# Patient Record
Sex: FEMALE | Race: WHITE | NOT HISPANIC OR LATINO | Employment: FULL TIME | URBAN - METROPOLITAN AREA
[De-identification: names, ages, dates, MRNs, and addresses within clinical notes are randomized per-mention and may not be internally consistent; named-entity substitution may affect disease eponyms.]

---

## 2019-01-28 ENCOUNTER — OFFICE VISIT CONVERTED (OUTPATIENT)
Dept: PULMONOLOGY | Facility: CLINIC | Age: 34
End: 2019-01-28
Attending: INTERNAL MEDICINE

## 2019-02-27 ENCOUNTER — HOSPITAL ENCOUNTER (OUTPATIENT)
Dept: CARDIOLOGY | Facility: HOSPITAL | Age: 34
Discharge: HOME | End: 2019-02-27
Attending: INTERNAL MEDICINE

## 2019-03-28 ENCOUNTER — HOSPITAL ENCOUNTER (OUTPATIENT)
Dept: OTHER | Facility: HOSPITAL | Age: 34
Discharge: HOME | End: 2019-03-28

## 2019-03-28 ENCOUNTER — OFFICE VISIT CONVERTED (OUTPATIENT)
Dept: PULMONOLOGY | Facility: CLINIC | Age: 34
End: 2019-03-28
Attending: INTERNAL MEDICINE

## 2019-05-16 ENCOUNTER — CONVERSION ENCOUNTER (OUTPATIENT)
Dept: SURGERY | Facility: CLINIC | Age: 34
End: 2019-05-16

## 2019-05-16 ENCOUNTER — OFFICE VISIT CONVERTED (OUTPATIENT)
Dept: UROLOGY | Facility: CLINIC | Age: 34
End: 2019-05-16
Attending: UROLOGY

## 2019-06-20 ENCOUNTER — OFFICE VISIT CONVERTED (OUTPATIENT)
Dept: UROLOGY | Facility: CLINIC | Age: 34
End: 2019-06-20
Attending: UROLOGY

## 2019-06-28 ENCOUNTER — HOSPITAL ENCOUNTER (OUTPATIENT)
Dept: ULTRASOUND IMAGING | Facility: HOSPITAL | Age: 34
Discharge: HOME | End: 2019-06-28

## 2019-07-24 ENCOUNTER — OFFICE VISIT CONVERTED (OUTPATIENT)
Dept: UROLOGY | Facility: CLINIC | Age: 34
End: 2019-07-24
Attending: UROLOGY

## 2019-07-24 ENCOUNTER — CONVERSION ENCOUNTER (OUTPATIENT)
Dept: SURGERY | Facility: CLINIC | Age: 34
End: 2019-07-24

## 2019-09-10 ENCOUNTER — OFFICE VISIT CONVERTED (OUTPATIENT)
Dept: PULMONOLOGY | Facility: CLINIC | Age: 34
End: 2019-09-10
Attending: INTERNAL MEDICINE

## 2019-09-25 ENCOUNTER — HOSPITAL ENCOUNTER (OUTPATIENT)
Dept: GENERAL RADIOLOGY | Facility: HOSPITAL | Age: 34
Discharge: HOME | End: 2019-09-25

## 2019-09-27 ENCOUNTER — PROCEDURE VISIT CONVERTED (OUTPATIENT)
Dept: UROLOGY | Facility: CLINIC | Age: 34
End: 2019-09-27
Attending: UROLOGY

## 2019-10-21 ENCOUNTER — CONVERSION ENCOUNTER (OUTPATIENT)
Dept: SURGERY | Facility: CLINIC | Age: 34
End: 2019-10-21

## 2019-10-21 ENCOUNTER — OFFICE VISIT CONVERTED (OUTPATIENT)
Dept: UROLOGY | Facility: CLINIC | Age: 34
End: 2019-10-21
Attending: UROLOGY

## 2020-01-28 ENCOUNTER — OFFICE VISIT CONVERTED (OUTPATIENT)
Dept: PULMONOLOGY | Facility: CLINIC | Age: 35
End: 2020-01-28
Attending: INTERNAL MEDICINE

## 2020-05-13 ENCOUNTER — HOSPITAL ENCOUNTER (OUTPATIENT)
Dept: CT IMAGING | Facility: HOSPITAL | Age: 35
Discharge: HOME | End: 2020-05-13
Attending: INTERNAL MEDICINE

## 2021-05-15 VITALS
BODY MASS INDEX: 26.68 KG/M2 | HEIGHT: 62 IN | SYSTOLIC BLOOD PRESSURE: 100 MMHG | WEIGHT: 145 LBS | DIASTOLIC BLOOD PRESSURE: 62 MMHG

## 2021-05-15 VITALS
BODY MASS INDEX: 25.86 KG/M2 | WEIGHT: 140.5 LBS | SYSTOLIC BLOOD PRESSURE: 110 MMHG | DIASTOLIC BLOOD PRESSURE: 76 MMHG | HEIGHT: 62 IN

## 2021-05-15 VITALS — RESPIRATION RATE: 12 BRPM | HEIGHT: 62 IN | BODY MASS INDEX: 26.36 KG/M2 | WEIGHT: 143.25 LBS

## 2021-05-15 VITALS
WEIGHT: 142.5 LBS | HEIGHT: 62 IN | SYSTOLIC BLOOD PRESSURE: 104 MMHG | BODY MASS INDEX: 26.22 KG/M2 | DIASTOLIC BLOOD PRESSURE: 70 MMHG

## 2021-05-15 VITALS
WEIGHT: 140.12 LBS | SYSTOLIC BLOOD PRESSURE: 100 MMHG | HEIGHT: 62 IN | BODY MASS INDEX: 25.79 KG/M2 | DIASTOLIC BLOOD PRESSURE: 62 MMHG

## 2021-05-19 ENCOUNTER — HOSPITAL ENCOUNTER (OUTPATIENT)
Dept: URGENT CARE | Facility: CLINIC | Age: 36
Discharge: HOME | End: 2021-05-19
Attending: FAMILY MEDICINE

## 2021-05-27 ENCOUNTER — HOSPITAL ENCOUNTER (OUTPATIENT)
Dept: URGENT CARE | Facility: CLINIC | Age: 36
Discharge: HOME | End: 2021-05-27
Attending: FAMILY MEDICINE

## 2021-05-28 VITALS
HEIGHT: 62 IN | HEIGHT: 62 IN | RESPIRATION RATE: 16 BRPM | WEIGHT: 146.56 LBS | SYSTOLIC BLOOD PRESSURE: 107 MMHG | WEIGHT: 127.37 LBS | HEART RATE: 85 BPM | BODY MASS INDEX: 26.6 KG/M2 | DIASTOLIC BLOOD PRESSURE: 46 MMHG | BODY MASS INDEX: 25.42 KG/M2 | WEIGHT: 144.56 LBS | SYSTOLIC BLOOD PRESSURE: 113 MMHG | SYSTOLIC BLOOD PRESSURE: 114 MMHG | SYSTOLIC BLOOD PRESSURE: 113 MMHG | DIASTOLIC BLOOD PRESSURE: 73 MMHG | BODY MASS INDEX: 26.97 KG/M2 | TEMPERATURE: 98.4 F | TEMPERATURE: 98.4 F | DIASTOLIC BLOOD PRESSURE: 56 MMHG | BODY MASS INDEX: 23.44 KG/M2 | HEIGHT: 62 IN | WEIGHT: 138.12 LBS | HEART RATE: 81 BPM | RESPIRATION RATE: 12 BRPM | TEMPERATURE: 98.2 F | HEART RATE: 82 BPM | HEART RATE: 76 BPM | HEIGHT: 62 IN | OXYGEN SATURATION: 98 % | OXYGEN SATURATION: 99 % | OXYGEN SATURATION: 100 % | RESPIRATION RATE: 12 BRPM | RESPIRATION RATE: 12 BRPM | TEMPERATURE: 98 F | DIASTOLIC BLOOD PRESSURE: 60 MMHG | OXYGEN SATURATION: 98 %

## 2021-05-28 NOTE — PROGRESS NOTES
Patient: VICENTE GROVER     Acct: [id number]     Report: #[id number]  UNIT #: [id number]     : 1985    Encounter Date:09/10/2019  PRIMARY CARE:   ***Signed***  --------------------------------------------------------------------------------------------------------------------  Chief Complaint      Encounter Date      Sep 10, 2019            Primary Care Provider            Marah Sevilla/ [facility]            Referring Provider            Marah Sevilla/ [facility]            Patient Complaint      Patient is complaining of      Pt here for 4 month follow up/COPD            VITALS      Height 5 ft 2 in / 157.48 cm      Weight 138 lbs 2 oz / 62.61740 kg      BSA 1.63 m2      BMI 25.3 kg/m2      Temperature 98.2 F / 36.78 C - Oral      Pulse 76      Respirations 12      Blood Pressure 114/56 Sitting, Left Arm      Pulse Oximetry 98%, room air      Initial Exhaled Nitrous Oxide      Date:  2019      Exhaled Nitrous Oxide Results:  11            HPI      The patient is a pleasant 34 year old female who last seen by myself in 2019     for mild intermittent asthma as well as recurrent bronchitis.            Since the patient last saw me she started smoking again. She is back up to one     pack per day, contributes restarting to stress with her job and getting .     She has changed jobs to third shift and finds it difficult to remember to take     her inhalers as prescribed. She quit using Breo because she did not think it was    working as well as Symbicort which she has tried in the past. She does have an     albuterol inhaler, but forgets to take it with her to work. She also has     albuterol nebulizer, but only uses this when she is sick with bronchitis. She     has not been treated with antibiotics or steroids to her knowledge.  She does     complain of congestion and nasal discharge as well as fluid on the right ear.      Her cough has  been worse since starting back to smoke. It is worse in the     morning times upon wakening and tends to improve throughout her day.  She has     intermittent wheezing and cough.  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            ROS:  A full 12 point review of systems was explored with the patient and     appropriately documented in the chart.              Past medical, family, social and surgical history reviewed and I agree with that    as documented in the medical chart.  Since she was last seen, her mom has been     told she has lung nodules, but has yet to see a lung doctor. Her mom also has a     history of bladder cancer.  I also amended the chart to include that she has     started smoking again.              Medications were reviewed and updated in the chart.            ROS      Constitutional:  Denies: Fatigue, Fever, Weight gain, Weight loss, Chills,     Insomnia, Other      Respiratory/Breathing:  Complains of: Shortness of air (worse with smoking,     worse in mornings after sleep), Wheezing, Cough; Denies: Hemoptysis, Pleuritic     pain, Other      Endocrine:  Denies: Polydipsia, Polyuria, Heat/cold intolerance, Abnorml     menstrual pattern, Diabetes, Other      Eyes:  Denies: Blurred vision, Vision Changes, Other      Ears, nose, mouth, throat:  Complains of: Congestion, Nasal Discharge, Other     (Fluid on right ear); Denies: Dysphagia, Hearing Changes, Nose Bleeding, Throat     pain, Tinnitus      Cardiovascular:  Denies: Chest Pain, Exertional dyspnea, Peripheral Edema, Pal    pitations, Syncope, Wake up Gasping for air, Orthopnea, Tachycardia, Other      Gastrointestinal:  Complains of: Nausea, Vomiting; Denies: Abdominal     pain/cramping, Bloody stools, Constipation, Diarrhea, Melena, Other      Genitourinary:  Denies: Dysuria, Urinary frequency, Incontinence, Hematuria,     Urgency, Other       Musculoskeletal:  Denies: Joint Pain, Joint Stiffness, Joint Swelling, Myalgias,    Other      Hematologic/lymphatic:  DENIES: Lymphadenopathy, Bruising, Bleeding tendencies,     Other      Neurologic:  Complains of: Headache; Denies: Numbness, Weakness, Seizures, Other      Psychiatric:  Complains of: Other (Bipolar); Denies: Anxiety, Appropriate     Effect, Depression      Sleep:  No: Excessive daytime sleep, Morning Headache?, Snoring, Insomnia?, Stop    breathing at sleep?, Other      Integumentary:  Denies: Rash, Dry skin, Skin Warm to Touch, Other            FAMILY/SOCIAL/MEDICAL HX      Surgical History:  Yes: Oral Surgery (tonsils removed)      Diabetes - Family Hx:  Mother      Cancer/Type - Family Hx:  Mother (bladder), Aunt (bladder)      Is Father Still Living?:  Yes      Is Mother Still Living?:  Yes      Social History:  Tobacco Use; No Alcohol Use, No Recreational Drug use      Smoking status:  Current every day smoker (1 ppd x 20 years/  quit Sept started     back in March)       Section:  No      Tubal Ligation:  Yes      Hysterectomy:  No      Anticoagulation Therapy:  No      Antibiotic Prophylaxis:  No      Medical History:  Yes: Allergies, Asthma, Chronic Bronchitis/COPD, Bipolar     Disorder, High Cholesterol, Shortness Of Breath, Miscellaneous Medical/oth (HX     OVARIAN CYSTS); No: Arthritis, Blood Disease, Chemotherapy/Cancer, Deafness or     Ringing Ears, Diabetes, Hemorrhoids/Rectal Prob, High Blood Pressure      Psychiatric History      Bipolar Disorder            PREVENTION      Hx Influenza Vaccination:  No      Influenza Vaccine Declined:  Yes      2 or More Falls Past Year?:  No      Fall Past Year with Injury?:  No      Hx Pneumococcal Vaccination:  No      Encouraged to follow-up with:  PCP regarding preventative exams.      Chart initiated by      Rosalia Lake MA            ALLERGIES/MEDICATIONS      Allergies:        Coded Allergies:             SULFAMETHOXAZOLE (Verified   Allergy, Intermediate, 9/10/19)                  upset stomach           TRIMETHOPRIM (Verified  Allergy, Intermediate, 9/10/19)                  upset stomach           EGG (Verified  Allergy, Unknown, UNKNOWN, 9/10/19)                  EGG YOLKS           TRAMADOL (Unverified  Allergy, Unknown, UNK, 9/10/19)      Medications    Last Reconciled on 9/10/19 10:13 by FUNMILAYO DALEY,       Budesonide/Formoterol Fumarate (Symbicort 160/4.5 Mcg) 10.2 Gm Inh               Prov: FUNMILAYO DALEY         9/10/19       Kevin-Fluticasone (Fluticasone 50 mcg) 16 Gm Spray.susp      1 PUFFS NARE EACH QDAY, #1 BOTTLE 3 Refills         Prov: FUNMILAYO DALEY         9/10/19       Cetirizine Hcl (ZyrTEC) 10 Mg Tablet      10 MG PO QDAY, #30 TAB 6 Refills         Prov: FUNMILAYO DALEY         9/10/19       Budesonide/Formoterol Fumarate (Symbicort 160/4.5 Mcg) 10.2 Gm Inh      2 PUFF INH RTBID, #1 INH 4 Refills         Prov: FUNMILAYO DALEY         9/10/19       Montelukast Sodium (Singulair*) 10 Mg Tab      10 MG PO QDAY, #30 TAB 0 Refills         Reported         9/10/19       Ondansetron Hcl (ONDANSETRON HCL) 4 Mg Tablet      4 MG PO Q6H PRN for NAUSEA, TAB 0 Refills         Reported         9/10/19       traZODone HCl (Desyrel) 50 Mg Tablet      100 MG PO QDAY, #60 TAB 0 Refills         Reported         3/28/19       Cariprazine Hydrochloride (Vraylar) 3 Mg Capsule      3 MG PO QDAY, #30 CAP 0 Refills         Reported         3/28/19       Albuterol Sulfate (Albuterol Sulfate) 1.25 Mg/3 Ml Vial.neb      1.25 MG INH Q4H PRN for SHORTNESS OF BREATH, #120 NEB 0 Refills         Reported         1/28/19       MDI-Albuterol (Ventolin HFA) 18 Gm Hfa.aer.ad      2 PUFFS INH Q6H PRN for SHORTNESS OF BREATH, #1 MDI 0 Refills         Reported         1/28/19       Ketotifen Fumarate (Itchy Eye Drops) 5 Ml Drops      1 DROPS EYE RT BID, #1 BOTTLE         Reported         1/28/19      Current Medications      Current Medications Reviewed 9/10/19             EXAM      VITAL SIGNS:  Reviewed.        NECK:  Supple without tracheal deviation or lymphadenopathy.  No thyromegaly     appreciated.      LYMPHATICS:  No cervical or supraclavicular lymphadenopathy.      HEENT: Pupils are equal, round and reactive to light. There is no scleral     icterus.  Nares patent without hypertrophy of the turbinates. No erythema of the    passages.  There does appear to be serous fluid behind the TM, there is no     bulging or erythema appreciated.  She has upper dentures.        RESPIRATORY:  No dullness to percussion, no crackles appreciated on     auscultation.        CARDIOVASCULAR:  Regular rate and rhythm.  No murmurs, gallops or rubs.  No     lower extremity edema.  Equal radial pulses.        GI: Soft, nontender, nondistended, no organomegaly.  Bowel sounds present in all    four quadrants.      MUSCULOSKELETAL:  No joint effusions, erythema or warmth over the major joint     systems.      SKIN:  No rashes or lesions.      NEUROLOGIC: Cranial nerves II-XII are intact bilaterally.  Moves all     extremities. Ambulates with ease.      PSYCH:  Appropriate mood and affect.      Vitals      Vitals:             Height 5 ft 2 in / 157.48 cm           Weight 138 lbs 2 oz / 62.41270 kg           BSA 1.63 m2           BMI 25.3 kg/m2           Temperature 98.2 F / 36.78 C - Oral           Pulse 76           Respirations 12           Blood Pressure 114/56 Sitting, Left Arm           Pulse Oximetry 98%, room air            REVIEW      Results Reviewed      PCCS Results Reviewed?:  Yes Prev Lab Results, Yes Prev Radiology Results, Yes     [facility]      Lab Results      I reviewed my last clinic note.  Reviewed pulmonary function testing from     03/2019.            Reviewed CT scan of the chest from 02/27/2019.            No lab work for my review.            Assessment      Uncontrolled moderate persistent asthma - J45.40            URI (upper respiratory infection) - J06.9             Notes      New Medications      * ONDANSETRON HCL 4 MG TABLET: 4 MG PO Q6H PRN NAUSEA      * Montelukast Sodium (Singulair*) 10 MG TAB: 10 MG PO QDAY #30      * CETIRIZINE HCL (ZyrTEC) 10 MG TABLET: 10 MG PO QDAY #30      * Kevin-Fluticasone (Fluticasone 50 mcg) 16 GM SPRAY.SUSP: 1 PUFFS NARE EACH QDAY       #1      * Budesonide/Formoterol Fumarate (Symbicort 160/4.5 Mcg) 10.2 GM INH          Sample - Qty 1      * Budesonide/Formoterol Fumarate (Symbicort 160/4.5 Mcg) 10.2 GM INH: 2 PUFF INH      RTBID #1      Discontinued Medications      * Fluticasone/Vilanterol 100-25 Mcg Inh (Breo Ellipta 100-25 Mcg Inh) 1 EACH       BLST.W.DEV          Sample - Qty 1         Instructions: 1 puff QD         Dx: Chronic obstructive pulmonary disease - J44.9      * MDI-Advair 250/50 (Advair 250/50 Diskus) 1 EACH BLST.W.DEV: 1 PUFF INH RTBID       #1      New Diagnostics      * Inhaler Education, Routine         Dx: Uncontrolled moderate persistent asthma - J45.40      New Office Procedures      * Follow Up Appointment, 4 Months         Dx: Uncontrolled moderate persistent asthma - J45.40      ASSESSMENT:      1. Moderate persistent asthma, not currently controlled secondary to medication     noncompliance.      2.  Uncontrolled allergic rhinitis.      3. History of recurrent mucopurulent bronchitis.      4. Ongoing tobacco abuse with cigarettes.      5.  Wheezing.      6.  Serous otitis on the right.        7.  History of bipolar disorder.            PLAN:      1. I spent approximately five minutes today counseling the patient on the     importance of medication compliance in regards to asthma to prevent her from     having asthma exacerbations or worsening lung function.  I have called in     Symbicort as she has failed Advair and Breo and should qualify for insurance to     pay for Symbicort moving forward.  She should be on the 160 of her 4.5 dose two     puffs twice a day. We did make sure she was using appropriate  inhaler technique.      2.  Start Singulair 10 mg everyday.      3. Start Zyrtec 10 mg everyday.      4. Start Fluticasone on puff each nare daily.      5.  I spent three minutes of my time counseling the patient on smoking     cessation. She cannot take Chantix secondary to her history of bipolar disorder.    She is interested in quitting smoking, but finds stressors to stopping being her    work and anxiety.  She understands ongoing smoking increases her risks of     multiple cancers, stroke and heart disease. She is willing to try the nicotine     patch and states that she will get this OTC. I did give a handout for the     [id number] pamphlet.        6. A FENO was not repeated today.      7. Should she continue to be uncontrolled with her wheezing, shortness of breath    at follow up visit, would recommend getting CBC, IgE level and RAST testing.        8. We did give some samples of Delsym, Mucinex D to help with symptomatic     treatment of upper airway congestion as well as sinus congestion.      9. Recommended she take OTC Benadryl for her  serous otitis. No indication for     antibiotics today.            Patient Education      Tobacco Cessation Counseling:  for under 3 minutes (cant take Chantix)      Education resources provided:  Yes      Patient Education Provided:  Acute Asthma, Acute Bronchitis, COPD, How to use an    Inhaler      Time Spent:  > 50% /[facility]                 Disclaimer: Converted document may not contain table formatting or lab diagrams. Please see [employer] for the authenticated document.

## 2021-05-28 NOTE — PROGRESS NOTES
Patient: VICENTE GROVER     Acct: [id number]     Report: #[id number]  UNIT #: [id number]     : 1985    Encounter Date:2019  PRIMARY CARE:   ***Signed***  --------------------------------------------------------------------------------------------------------------------  Chief Complaint      Encounter Date      2019            Primary Care Provider            Marah Sevilla/ [facility]            Referring Provider            Marah Sevilla/ [facility]            Patient Complaint      Patient is complaining of      New pt here for COPD            VITALS      Height 5 ft 2 in / 157.48 cm      Weight 146 lbs 9 oz / 66.008004 kg      BSA 1.67 m2      BMI 26.8 kg/m2      Temperature 98.0 F / 36.67 C - Oral      Pulse 82      Respirations 12      Blood Pressure 113/46 Sitting, Left Arm      Pulse Oximetry 98%, room air      Initial Exhaled Nitrous Oxide      Date:  2019      Exhaled Nitrous Oxide Results:  11            HPI      The patient is a 33 year old female former tobacco user who was referred by Marah Carroll for evaluation of chronic obstructive pulmonary disease. The patient     complains of shortness of breath with minimal exertion. She explains this as     walking from her driveway to her house which is less than 20 feet on flat     ground. She also has wheezing and a cough. She quit smoking in September     secondary to these symptoms but then changed over to vaping. She is unable to do    any vigorous activities such as working out or housework that requires a lot of     bending or running the vacuum. She gets dizzy and lightheaded and thinks this is    secondary to having no oxygen flow. She will cough with minimal exertion. Her     cough is nonproductive. She has had approximately 5 chest infections per year      for the past 2 years requiring antibiotics and steroids. She has not been     hospitalized. She uses albuterol  approximately three times a day and is on Anoro    as well. She is disabled secondary to bipolar disorder and chronic obstructive     pulmonary disease. She lives with her significant other who also vapes. She has     been diagnosed with asthma in the past.             Review of Systems as noted.             Past family, medical, surgical and social histories were all reviewed by myself     with the patient.            Medications were reviewed by myself with the patient and updated in the chart.            ROS      Constitutional:  Denies: Fatigue, Fever, Weight gain, Weight loss, Chills,     Insomnia, Other      Respiratory/Breathing:  Complains of: Shortness of air, Wheezing, Cough; Denies:    Hemoptysis, Pleuritic pain, Other      Endocrine:  Denies: Polydipsia, Polyuria, Heat/cold intolerance, Abnorml     menstrual pattern, Diabetes, Other      Eyes:  Denies: Blurred vision, Vision Changes, Other      Ears, nose, mouth, throat:  Denies: Congestion, Dysphagia, Hearing Changes, Nose    Bleeding, Nasal Discharge, Throat pain, Tinnitus, Other      Cardiovascular:  Denies: Chest Pain, Exertional dyspnea, Peripheral Edema,     Palpitations, Syncope, Wake up Gasping for air, Orthopnea, Tachycardia, Other      Gastrointestinal:  Denies: Abdominal pain/cramping, Bloody stools, Constipation,    Diarrhea, Melena, Nausea, Vomiting, Other      Genitourinary:  Denies: Dysuria, Urinary frequency, Incontinence, Hematuria,     Urgency, Other      Musculoskeletal:  Denies: Joint Pain, Joint Stiffness, Joint Swelling, Myalgias,    Other      Hematologic/lymphatic:  DENIES: Lymphadenopathy, Bruising, Bleeding tendencies,     Other      Neurologic:  Complains of: Other (dizzy, light headed when short of air);     Denies: Headache, Numbness, Weakness, Seizures      Psychiatric:  Denies: Anxiety, Appropriate Effect, Depression, Other      Sleep:  No: Excessive daytime sleep, Morning Headache?, Snoring, Insomnia?, Stop    breathing at  sleep?, Other      Integumentary:  Denies: Rash, Dry skin, Skin Warm to Touch, Other            FAMILY/SOCIAL/MEDICAL HX      Surgical History:  Yes: Oral Surgery (tonsils removed)      Diabetes - Family Hx:  Mother      Cancer/Type - Family Hx:  Mother (bladder), Aunt (bladder)      Is Father Still Living?:  Yes      Is Mother Still Living?:  Yes      Social History:  Tobacco Use; No Alcohol Use, No Recreational Drug use      Smoking status:  Former smoker (1 ppd x 20 years/ quit 2018)       Section:  No      Tubal Ligation:  Yes      Hysterectomy:  No      Anticoagulation Therapy:  No      Antibiotic Prophylaxis:  No      Medical History:  Yes: Allergies, Asthma, Chronic Bronchitis/COPD, Bipolar     Disorder, High Cholesterol, Shortness Of Breath, Miscellaneous Medical/oth (HX     OVARIAN CYSTS); No: Arthritis, Blood Disease, Chemotherapy/Cancer, Deafness or     Ringing Ears, Diabetes, Hemorrhoids/Rectal Prob, High Blood Pressure      Psychiatric History      Bipolar Disorder            PREVENTION      Hx Influenza Vaccination:  No      Influenza Vaccine Declined:  Yes      2 or More Falls Past Year?:  No      Fall Past Year with Injury?:  No      Hx Pneumococcal Vaccination:  No      Encouraged to follow-up with:  PCP regarding preventative exams.      Chart initiated by      Rosalia Lake MA            ALLERGIES/MEDICATIONS      Allergies:        Coded Allergies:             Bactrim (Verified  Allergy, Intermediate, 19)                  upset stomach           EGG (Verified  Allergy, Unknown, UNKNOWN, 19)                  EGG YOLKS           TRAMADOL (Unverified  Allergy, Unknown, UNK, 19)      Medications    Last Reconciled on 19 09:40 by FUNMILAYO DALEY,       Albuterol Sulfate (Albuterol Sulfate) 1.25 Mg/3 Ml Vial.neb      1.25 MG INH Q4H PRN for SHORTNESS OF BREATH, #120 NEB 0 Refills         Reported         19       MDI-Albuterol (Ventolin HFA) 18 Gm Hfa.aer.ad      2  PUFFS INH Q6H PRN for SHORTNESS OF BREATH, #1 MDI 0 Refills         Reported         1/28/19       Umeclidinium/Vilanterol 62.5-25 Mcg Inh (Anoro Ellipta 62.5-25 Mcg Inh) 1 Each     Blst.w.dev      1 PUFF INH QDAY, #1 INH 0 Refills         Reported         1/28/19       Ketotifen Fumarate (Itchy Eye Drops) 5 Ml Drops      1 DROPS EYE RT BID, #1 BOTTLE         Reported         1/28/19       Oxybutynin XL (Oxybutynin XL) 15 Mg Tab.er.24      15 MG PO QDAY, TAB         Reported         1/28/19       ARIPiprazole (ABILIFY) 10 Mg Tablet      10 MG PO QDAY, #30 TAB         Reported         1/28/19       Doxepin HCl (Doxepin) 10 Mg Capsule      10 MG PO HS, CAP         Reported         1/28/19      Current Medications      Current Medications Reviewed 1/28/19            EXAM      Vital signs reviewed.      HEENT: Pupils are equally round and reactive to light and accommodation.      Extraocular muscles intact bilateral. Nares patent without hypertrophy of the     turbinates. There is a nose ring present and a ring in the lower lip. TM's are     clear bilaterally. Small oropharynx without lesions or erythema.       NECK:  Supple without tracheal deviation or lymphadenopathy. No thyromegaly     appreciated.       LYMPHATICS: No cervical or supraclavicular lymphadenopathy.       RESPIRATORY:  Clear to auscultation bilaterally, no wheezes, rales or rhonchi,     tympanic to percussion.      CVS:  Regular rate and rhythm, no murmurs, rubs or gallops, no lower extremity     edema, , equal radial pulses.      GI: Abdomen soft, nontender, nondistended, no hepatomegaly appreciated.  Bowel     sounds present in all 4 quadrants.       MUSCULOSKELETAL: No erythema, warmth or fluctuance over the major joints     including the knees, ankles, wrists and elbows.        SKIN: No rashes or lesions.       NEUROLOGICAL: Alert and oriented X 3.  No focal deficits on exam. Cranial nerves    II-XII intact bilaterally.       PSYCH: Patient has  appropriate mood and affect.      Vitals      Vitals:             Height 5 ft 2 in / 157.48 cm           Weight 146 lbs 9 oz / 66.382392 kg           BSA 1.67 m2           BMI 26.8 kg/m2           Temperature 98.0 F / 36.67 C - Oral           Pulse 82           Respirations 12           Blood Pressure 113/46 Sitting, Left Arm           Pulse Oximetry 98%, room air            Assessment      Bronchitis, mucopurulent recurrent - J41.1            COPD (chronic obstructive pulmonary disease) with chronic bronchitis - J44.9            Notes      New Medications      * Doxepin HCl (Doxepin) 10 MG CAPSULE: 10 MG PO HS      * ARIPiprazole (ABILIFY) 10 MG TABLET: 10 MG PO QDAY #30      * Oxybutynin XL 15 MG TAB.ER.24: 15 MG PO QDAY      * Ketotifen Fumarate (Itchy Eye Drops) 5 ML DROPS: 1 DROPS EYE RT BID #1      * Umeclidinium/Vilanterol 62.5-25 Mcg Inh (Anoro Ellipta 62.5-25 Mcg Inh) 1 EACH      BLST.W.DEV: 1 PUFF INH QDAY #1      * MDI-Albuterol (Ventolin HFA) 18 GM HFA.AER.AD: 2 PUFFS INH Q6H PRN SHORTNESS       OF BREATH #1      * Albuterol Sulfate 1.25 MG/3 ML VIAL.NEB: 1.25 MG INH Q4H PRN SHORTNESS OF       BREATH #120         Instructions: DIAGNOSIS CODE REQUIRED PRIOR TO PRESCRIBING.      * ARFORMOTEROL TARTRATE (Brovana) 15 MCG/2 ML VIAL.NEB: 15 MCG INH RTQ12H #60         Instructions: DIAGNOSIS CODE REQUIRED PRIOR TO PRESCRIBING.         Dx: Bronchitis, mucopurulent recurrent - J41.1      * Neb-Budesonide (Pulmicort) 0.5 MG/2 ML AMPUL.NEB: 0.5 MG INH BID #60      * TIOTROPIUM BROMIDE (Spiriva Respimat 2.5 mcg/Puff) 4 GM MIST.INHAL: 2 PUFFS       INH RTQDAY #1      * UMECLIDINIUM BROMIDE (Incruse Ellipta) 62.5 MCG BLST.W.DEV: 1 PUFF INH RTQDAY       #1      New Diagnostics      * 6 Min Walk With Pulse Ox, Routine         Dx: COPD (chronic obstructive pulmonary disease) with chronic bronchitis -        J44.9      * PFT-Comp, PrePost,DLCO,BodyBox, Routine         Dx: COPD (chronic obstructive pulmonary disease) with  chronic bronchitis -        J44.9      * Chest W/O Cont CT, SCHEDULED PROCEDURE         Dx: Bronchitis, mucopurulent recurrent - J41.1      ASSESSMENT:      1. Chronic obstructive pulmonary disease with chronic bronchitis.      2. History of recurrent mucopurulent bronchitis.       3. History of asthma.       4. Former tobacco user of cigarettes in remission, currently vaping.        5. Chronic cough.       6. Dyspnea on exertion.       7. Wheezing.             PLAN:      1. I will stop the patient's Anoro and start her on Brovana and Pulmicort     nebulizers twice daily and add Incruse 1 puff inhaled once daily for long acting    anticholinergic therapy.       2. I have asked for a dedicated chest CT scan without contrast given her     recurrent mucopurulent bronchitis and significant breathlessness.       3. Obtain baseline pulmonary function tests and six minute walk test.      4. We gave the patient samples of Brovana and Incruse and taught her now to     appropriately use them.       5.  I gave her a handout on chronic obstructive pulmonary disease and stressed     to her the importance of stopping vaping. I spent approximately 3 minutes of my     time counseling the patient on the importance of stopping all inhaled irritants     including vaping as this is also irritating to the tracheobronchial tree. The     patient verbalized understanding.       6. We will see the patient back in 2 months to go over her testing and see how     she is doing on the nebulizers.            Patient Education      Tobacco Cessation Counseling:  for 3 - 10 minutes      Patient Education Provided:  COPD, How to use an Inhaler, How to use a     Nebulizer, Lung Cancer, Smoking Cessation      Time Spent:  > 50% /Coord Care                 Disclaimer: Converted document may not contain table formatting or lab diagrams. Please see [employer] for the authenticated document.

## 2021-05-28 NOTE — PROGRESS NOTES
Patient: VICENTE GROVER     Acct: [id number]     Report: #[id number]  UNIT #: [id number]     : 1985    Encounter Date:2020  PRIMARY CARE:   ***Signed***  --------------------------------------------------------------------------------------------------------------------  Chief Complaint      Encounter Date      2020            Primary Care Provider            Marah Sevilla/ [facility]            Referring Provider            Marah Sevilla/ [facility]            Patient Complaint      Patient is complaining of      Pt here for 4 month follow up/COPD            VITALS      Height 5 ft 2 in / 157.48 cm      Weight 127 lbs 6 oz / 57.752089 kg      BSA 1.58 m2      BMI 23.3 kg/m2      Temperature 98.4 F / 36.89 C - Oral      Pulse 81      Respirations 12      Blood Pressure 113/60 Sitting, Left Arm      Pulse Oximetry 99%, room air      Initial Exhaled Nitrous Oxide      Date:  2019      Exhaled Nitrous Oxide Results:  11            HPI      The patient is a 34 year old current everyday smoker of cigarettes who presents     for four month follow up regarding asthma.  At her last visit, I did a prior     authorization to get her approved for Symbicort as she had failed Breo, Dulera     and another inhaled corticosteroid that I cannot recall the name of at this     time. Her insurance approved the Symbicort and she has been using it twice a day    as prescribed. She notices a dramatic improvement in her cough, shortness of     breath and wheezing since being on Symbicort, however over the past five days     she has been having worsening shortness of breath, fatigue, wheezing and     coughing up thick yellow brown colored phlegm. She has been having to use her     rescue inhaler more often, upwards of 3-4 times per day. She needs a refill of     the albuterol HFA today.  Her last CT scan was in 2019.  She had 2 mm nodules    scattered,  thought to be benign, but in the past year her mom has been diagnosed    with lung cancer and the patient has continued to smoke putting her at increased    risk of lung cancer.  She complains of pleuritic type pain in the back on the     right underneath her shoulder blade that started this morning, nothing seems to     make it worse or better.            ROS      Constitutional:  Denies: Fatigue, Fever, Weight gain, Weight loss, Chills,     Insomnia, Other      Respiratory/Breathing:  Complains of: Wheezing, Cough; Denies: Shortness of air,    Hemoptysis, Pleuritic pain, Other      Endocrine:  Denies: Polydipsia, Polyuria, Heat/cold intolerance, Abnorml     menstrual pattern, Diabetes, Other      Eyes:  Denies: Blurred vision, Vision Changes, Other      Ears, nose, mouth, throat:  Denies: Congestion, Dysphagia, Hearing Changes, Nose    Bleeding, Nasal Discharge, Throat pain, Tinnitus, Other      Cardiovascular:  Complains of: Exertional dyspnea; Denies: Chest Pain,     Peripheral Edema, Palpitations, Syncope, Wake up Gasping for air, Orthopnea,     Tachycardia, Other      Gastrointestinal:  Denies: Abdominal pain/cramping, Bloody stools, Constipation,    Diarrhea, Melena, Nausea, Vomiting, Other      Genitourinary:  Denies: Dysuria, Urinary frequency, Incontinence, Hematuria,     Urgency, Other      Musculoskeletal:  Complains of: Joint Pain (right shoulder blade); Denies: Joint    Stiffness, Joint Swelling, Myalgias, Other      Hematologic/lymphatic:  DENIES: Lymphadenopathy, Bruising, Bleeding tendencies,     Other      Neurologic:  Complains of: Headache (Migraines); Denies: Numbness, Weakness,     Seizures, Other      Psychiatric:  Complains of: Depression; Denies: Anxiety, Appropriate Effect, O    ther      Sleep:  No: Excessive daytime sleep, Morning Headache?, Snoring, Insomnia?, Stop    breathing at sleep?, Other      Integumentary:  Denies: Rash, Dry skin, Skin Warm to Touch, Other             FAMILY/SOCIAL/MEDICAL HX      Surgical History:  Yes: Oral Surgery (tonsils removed)      Diabetes - Family Hx:  Mother      Cancer/Type - Family Hx:  Mother (bladder and Lung), Aunt (bladder)      Is Father Still Living?:  Yes      Is Mother Still Living?:  Yes      Social History:  Tobacco Use; No Alcohol Use, No Recreational Drug use      Smoking status:  Current every day smoker (1 ppd x 20 years/  quit Sept started     back in March)       Section:  No      Tubal Ligation:  Yes      Hysterectomy:  No      Anticoagulation Therapy:  No      Antibiotic Prophylaxis:  No      Medical History:  Yes: Allergies, Asthma, Chronic Bronchitis/COPD, Depression,     Bipolar Disorder, High Cholesterol, Shortness Of Breath, Miscellaneous     Medical/oth (HX OVARIAN CYSTS); No: Arthritis, Blood Disease,     Chemotherapy/Cancer, Deafness or Ringing Ears, Diabetes, Hemorrhoids/Rectal     Prob, High Blood Pressure      Psychiatric History      Bipolar Disorder/Depression            PREVENTION      Hx Influenza Vaccination:  No      Influenza Vaccine Declined:  Yes      2 or More Falls Past Year?:  No      Fall Past Year with Injury?:  No      Hx Pneumococcal Vaccination:  No      Encouraged to follow-up with:  PCP regarding preventative exams.      Chart initiated by      Rosalia Lake MA            ALLERGIES/MEDICATIONS      Allergies:        Coded Allergies:             SULFAMETHOXAZOLE (Verified  Allergy, Intermediate, 20)                  upset stomach           TRIMETHOPRIM (Verified  Allergy, Intermediate, 20)                  upset stomach           EGG (Verified  Allergy, Unknown, UNKNOWN, 20)                  EGG YOLKS           TRAMADOL (Unverified  Allergy, Unknown, UNK, 20)      Medications    Last Reconciled on 20 09:46 by FUNMILAYO DALEY DO      Budesonide/Formoterol Fumarate (Symbicort 160/4.5 Mcg) 10.2 Gm Inh      2 PUFF INH RTBID, #1 INH 4 Refills         Prov: FUNMILAYO DALEY          9/11/19       Budesonide/Formoterol Fumarate (Symbicort 160/4.5 Mcg) 10.2 Gm Inh               Prov: FUNMILAYO DALEY         9/10/19       Kevin-Fluticasone (Fluticasone 50 mcg) 16 Gm Spray.susp      1 PUFFS NARE EACH QDAY, #1 BOTTLE 3 Refills         Prov: FUNMILAYO DALEY         9/10/19       Cetirizine Hcl (zyrTEC) 10 Mg Tablet      10 MG PO QDAY, #30 TAB 6 Refills         Prov: FUNMILAYO DALEY         9/10/19       Montelukast Sodium (Singulair*) 10 Mg Tab      10 MG PO QDAY, #30 TAB 0 Refills         Reported         9/10/19       Ondansetron Hcl (ONDANSETRON HCL) 4 Mg Tablet      4 MG PO Q6H PRN for NAUSEA, TAB 0 Refills         Reported         9/10/19       Albuterol Sulfate (Albuterol Sulfate) 1.25 Mg/3 Ml Vial.neb      1.25 MG INH Q4H PRN for SHORTNESS OF BREATH, #120 NEB 0 Refills         Reported         1/28/19       MDI-Albuterol (Ventolin HFA) 18 Gm Hfa.aer.ad      2 PUFFS INH Q6H PRN for SHORTNESS OF BREATH, #1 MDI 0 Refills         Reported         1/28/19       Ketotifen Fumarate (Itchy Eye Drops) 5 Ml Drops      1 DROPS EYE RT BID, #1 BOTTLE         Reported         1/28/19      Current Medications      Current Medications Reviewed 1/28/20            EXAM      VITAL SIGNS:  Reviewed.        NECK:  Supple without tracheal deviation or lymphadenopathy.  No thyromegaly     appreciated.      LYMPHATICS:  No cervical or supraclavicular lymphadenopathy.      HEENT: Pupils are equal, round and reactive to light. There is no scleral     icterus.  Nares patent without hypertrophy of the turbinates. No erythema of the    passages.  TMs are clear bilaterally with good cone of light. The posterior     pharynx is without  lesions or erythema.      RESPIRATORY:  Coarse rhonchi scattered throughout the lower lobes of the lungs     with expiratory wheezes throughout all lung fields, prolonged expiratory phase     of respiration.        CARDIOVASCULAR:  Regular rate and rhythm.  No murmurs, gallops or rubs.  No     lower  extremity edema.  Equal radial pulses.        GI: Soft, nontender, nondistended, no organomegaly.  Bowel sounds present in all    four quadrants.      MUSCULOSKELETAL:  No joint effusions, erythema or warmth over the major joint     systems.      SKIN:  No rashes or lesions.      NEUROLOGIC: Cranial nerves II-XII are intact bilaterally.  Moves all     extremities. Ambulates with ease.      PSYCH:  Appropriate mood and affect.      Vitals      Vitals:             Height 5 ft 2 in / 157.48 cm           Weight 127 lbs 6 oz / 57.604407 kg           BSA 1.58 m2           BMI 23.3 kg/m2           Temperature 98.4 F / 36.89 C - Oral           Pulse 81           Respirations 12           Blood Pressure 113/60 Sitting, Left Arm           Pulse Oximetry 99%, room air            REVIEW      Results Reviewed      P[facility]S Results Reviewed?:  Yes Prev Lab Results, Yes Prev Radiology Results, Yes     Previous [facility] Records      Lab Results      I reviewed previous medical records, labs and notes.            Assessment      Lung nodule, multiple - R91.8            Family history of lung cancer - Z80.1            Smoker - F17.200            Chronic cough - R05            Notes      New Medications      * Azithromycin (Zithromax Z-Reji) 250 MG TABLET: 250 MG PO ASDIR #1         Instructions: Take 500 mg (two tablets) by mouth the first day, then 250 mg        (one tablet) daily until all taken.      * predniSONE 20 MG TABLET: 40 MG PO QDAY 5 Days #10      * Albuterol/Ipratropium (Duoneb) 3 ML AMPUL.NEB: 3 ML INH RTQ6H PRN WHEEZING /       SHORTNESS OF BREATH #120         Instructions: DIAGNOSIS CODE REQUIRED PRIOR TO PRESCRIBING.      Renewed Medications      * MDI-Albuterol (Ventolin HFA) 18 GM HFA.AER.AD: 2 PUFFS INH Q6H PRN SHORTNESS       OF BREATH #1      New Diagnostics      * Chest W/O Cont CT, Month         Dx: Lung nodule, multiple - R91.8      New Office Procedures      * Follow Up Appointment, 6 Months         Dx: Lung  nodule, multiple - R91.8      ASSESSMENT:      1. Moderate persistent asthma, improved on Symbicort.      2. Acute exacerbation of asthma.      3. Recurrent mucopurulent bronchitis.      4. Tobacco abuse with cigarettes, ongoing.      5. Pleuritic chest pain on the right.      6. History of pulmonary nodules.      7.  First degree relative with lung cancer.            PLAN:      1.  I have called in a Z-Reji and prednisone 40 mg once daily x5 days.      2.  Continue Symbicort two puffs inhaled twice a day.      3. I counseled the patient to continue Singulair, Zyrtec and Flonase daily.      4. I spent four minutes counseling the patient on smoking cessation efforts, she    is not interested in quitting at this time secondary to some personal issue she     does not care to discuss.  I have counseled the patient on the risks of     continued smoking including the risks of lung cancer, head and neck cancer,     renal cancer, heart disease, stroke and early death.  She did not want     pharmacologic help today.      5. I have reordered chest CT without contrast to be done in 02/2020 to follow up    on these small nodules while they are likely benign.  She is at high risk of     lung cancer.  Other differentials is infectious nodules with worsening cough.      6. I will see her back in six months, call her with the results of her CT scan     before that.        7. She cannot have flu vaccine because she has a history of egg yolk allergy and    has not been vaccinated again pneumococcal vaccine.            Patient Education      Tobacco Cessation Counseling:  for 3 - 10 minutes      Education resources provided:  Yes      Patient Education Provided:  Acute Asthma, COPD, How to use an Inhaler, How to     use a Nebulizer, Influenza (allergic to egg yolk), Lung Cancer, Smoking     Cessation      Time Spent:  > 50% /Coord Care            Electronically signed by FUNMILAYO DALEY  01/31/2020 10:50       Disclaimer: Converted  document may not contain table formatting or lab diagrams. Please see [employer] for the authenticated document.

## 2021-05-28 NOTE — PROGRESS NOTES
Patient: VICENTE GROVER     Acct: [id number]     Report: #[id number]  UNIT #: [id number]     : 1985    Encounter Date:2019  PRIMARY CARE:   ***Signed***  --------------------------------------------------------------------------------------------------------------------  Chief Complaint      Encounter Date      Mar 28, 2019            Primary Care Provider            Marah Sevilla/ [facility]            Referring Provider            Marah Sevilla/ [facility]            Patient Complaint      Patient is complaining of      Pt here for 2 month follow up/ chest ct results/ breathing test results/ 6 min     walk results/ COPD            VITALS      Height 5 ft 2 in / 157.48 cm      Weight 144 lbs 9 oz / 65.832882 kg      BSA 1.67 m2      BMI 26.4 kg/m2      Temperature 98.4 F / 36.89 C - Oral      Pulse 85      Respirations 16      Blood Pressure 107/73 Sitting, Right Arm      Pulse Oximetry 100%, room air      Initial Exhaled Nitrous Oxide      Date:  2019      Exhaled Nitrous Oxide Results:  11            HPI      The patient is a very pleasant 34 year old female former tobacco user who     established care with me on 19 for chronic obstructive pulmonary disease     evaluation. She was acutely ill at that time and I started her on Brovana and     Pulmicort nebulizers as well as Incruse 1 puff daily. I ordered CT scan,     pulmonary function tests and she is back today to review these studies. She has     stopped vaping in the interim since she last saw me.  She has also continued to     be smoke free from cigarettes. She has stopped using Brovana as prescribed and     has only being doing it as needed. She feels like her shortness of breath and     wheezing improved. She does cough intermittently from time to time still but it     is nonproductive. She denies fevers or chills or night sweats. She has no     mucopurulent production.              Review of Systems as noted.             Past family, medical, surgical and social histories were all reviewed by myself     with the patient and are unchanged.            Medications were reviewed by myself with the patient and updated in the chart.            ROS      Constitutional:  Denies: Fatigue, Fever, Weight gain, Weight loss, Chills,     Insomnia, Other      Respiratory/Breathing:  Complains of: Cough; Denies: Shortness of air, Wheezing,    Hemoptysis, Pleuritic pain, Other      Endocrine:  Denies: Polydipsia, Polyuria, Heat/cold intolerance, Abnorml     menstrual pattern, Diabetes, Other      Eyes:  Denies: Blurred vision, Vision Changes, Other      Ears, nose, mouth, throat:  Denies: Congestion, Dysphagia, Hearing Changes, Nose    Bleeding, Nasal Discharge, Throat pain, Tinnitus, Other      Cardiovascular:  Denies: Chest Pain, Exertional dyspnea, Peripheral Edema,     Palpitations, Syncope, Wake up Gasping for air, Orthopnea, Tachycardia, Other      Gastrointestinal:  Denies: Abdominal pain/cramping, Bloody stools, Constipation,    Diarrhea, Melena, Nausea, Vomiting, Other      Genitourinary:  Denies: Dysuria, Urinary frequency, Incontinence, Hematuria,     Urgency, Other      Musculoskeletal:  Denies: Joint Pain, Joint Stiffness, Joint Swelling, Myalgias,    Other      Hematologic/lymphatic:  DENIES: Lymphadenopathy, Bruising, Bleeding tendencies,     Other      Neurologic:  Denies: Headache, Numbness, Weakness, Seizures, Other      Psychiatric:  Denies: Anxiety, Appropriate Effect, Depression, Other      Sleep:  No: Excessive daytime sleep, Morning Headache?, Snoring, Insomnia?, Stop    breathing at sleep?, Other      Integumentary:  Denies: Rash, Dry skin, Skin Warm to Touch, Other            FAMILY/SOCIAL/MEDICAL HX      Surgical History:  Yes: Oral Surgery (tonsils removed)      Diabetes - Family Hx:  Mother      Cancer/Type - Family Hx:  Mother (bladder), Aunt (bladder)      Is Father Still  Living?:  Yes      Is Mother Still Living?:  Yes      Social History:  Tobacco Use; No Alcohol Use, No Recreational Drug use      Smoking status:  Former smoker (1 ppd x 20 years/ quit 2018)       Section:  No      Tubal Ligation:  Yes      Hysterectomy:  No      Anticoagulation Therapy:  No      Antibiotic Prophylaxis:  No      Medical History:  Yes: Allergies, Asthma, Chronic Bronchitis/COPD, Bipolar     Disorder, High Cholesterol, Shortness Of Breath, Miscellaneous Medical/oth (HX     OVARIAN CYSTS); No: Arthritis, Blood Disease, Chemotherapy/Cancer, Deafness or     Ringing Ears, Diabetes, Hemorrhoids/Rectal Prob, High Blood Pressure      Psychiatric History      Bipolar Disorder            PREVENTION      Hx Influenza Vaccination:  No      Influenza Vaccine Declined:  Yes      2 or More Falls Past Year?:  No      Fall Past Year with Injury?:  No      Hx Pneumococcal Vaccination:  No      Encouraged to follow-up with:  PCP regarding preventative exams.      Chart initiated by      Rosalia Lake MA            ALLERGIES/MEDICATIONS      Allergies:        Coded Allergies:             SULFAMETHOXAZOLE (Verified  Allergy, Intermediate, 19)                  upset stomach           TRIMETHOPRIM (Verified  Allergy, Intermediate, 19)                  upset stomach           EGG (Verified  Allergy, Unknown, UNKNOWN, 19)                  EGG YOLKS           TRAMADOL (Unverified  Allergy, Unknown, UNK, 19)      Medications    Last Reconciled on 3/28/19 16:45 by FUNMILAYO DALEY,       Fluticasone/Vilanterol 100-25 Mcg Inh (Breo Ellipta 100-25 Mcg Inh) 1 Each     Blst.w.dev               Prov: FUNMILAYO DALEY         3/28/19       Fluticasone/Vilanterol 100-25 Mcg Inh (Breo Ellipta 100-25 Mcg Inh) 1 Each     Blst.w.dev      1 PUFF INH QDAY, #1 MDI 4 Refills         Prov: FUNMILAYO DALEY         3/28/19       traZODone HCl (Desyrel) 50 Mg Tablet      100 MG PO QDAY, #60 TAB 0 Refills         Reported          3/28/19       Cariprazine Hydrochloride (Vraylar) 3 Mg Capsule      3 MG PO QDAY, #30 CAP 0 Refills         Reported         3/28/19       Albuterol Sulfate (Albuterol Sulfate) 1.25 Mg/3 Ml Vial.neb      1.25 MG INH Q4H PRN for SHORTNESS OF BREATH, #120 NEB 0 Refills         Reported         1/28/19       MDI-Albuterol (Ventolin HFA) 18 Gm Hfa.aer.ad      2 PUFFS INH Q6H PRN for SHORTNESS OF BREATH, #1 MDI 0 Refills         Reported         1/28/19       Ketotifen Fumarate (Itchy Eye Drops) 5 Ml Drops      1 DROPS EYE RT BID, #1 BOTTLE         Reported         1/28/19       Oxybutynin XL (Oxybutynin XL) 15 Mg Tab.er.24      15 MG PO QDAY, TAB         Reported         1/28/19      Current Medications      Current Medications Reviewed 3/28/19            EXAM      Vital signs reviewed.      HEENT: Pupils are equally round and reactive to light and accommodation.      Extraocular muscles intact bilateral. Nares patent without hypertrophy of the     turbinates.  TM's are clear bilaterally. Small oropharynx without lesions or     erythema.       NECK:  Supple without tracheal deviation or lymphadenopathy. No thyromegaly     appreciated.       LYMPHATICS: No cervical or supraclavicular lymphadenopathy.       RESPIRATORY:  Clear to auscultation bilaterally, no wheezes, rales or rhonchi,     tympanic to percussion.      CVS:  Regular rate and rhythm, no murmurs, rubs or gallops, no lower extremity     edema, , equal radial pulses.      GI: Abdomen soft, nontender, nondistended, no hepatomegaly appreciated.  Bowel     sounds present in all 4 quadrants.       MUSCULOSKELETAL: No erythema, warmth or fluctuance over the major joints     including the knees, ankles, wrists and elbows.        SKIN: No rashes or lesions.       NEUROLOGICAL: Alert and oriented X 3.  No focal deficits on exam. Cranial nerves    II-XII intact bilaterally.       PSYCH: Patient has appropriate mood and affect.      Vitals      Vitals:              Height 5 ft 2 in / 157.48 cm           Weight 144 lbs 9 oz / 65.555402 kg           BSA 1.67 m2           BMI 26.4 kg/m2           Temperature 98.4 F / 36.89 C - Oral           Pulse 85           Respirations 16           Blood Pressure 107/73 Sitting, Right Arm           Pulse Oximetry 100%, room air            REVIEW      Results Reviewed      PCCS Results Reviewed?:  Yes Prev Radiology Results, Yes Previous Mecial Records      Radiographic Results               [facility]                PACS RADIOLOGY REPORT            Patient: VICENTE JUNIOR   Acct: #[id number]   Report: #[id number]            UNIT #: [id number]    DOS: 19 1343      INSURANCE:[employer]   ORDER #:[id number]      LOCATION:[facility]     : 1985            PROVIDERS      ADMITTING:     ATTENDING: FUNMILAYO DALEY      FAMILY:  DIANN EDOUARD   ORDERING:  FUNMILAYO DALEY         OTHER:    DICTATING:  Abdon Alanis MD            REQ #:[id number]   EXAM:CHO - CT CHEST without CONTRAST      REASON FOR EXAM:  COPD      REASON FOR VISIT:  COPD            *******Signed******         PROCEDURE:   CT CHEST WITHOUT CONTRAST             COMPARISON:   None.             INDICATIONS:   COPD, cough             TECHNIQUE:   CT images were created without the administration of contrast     material.               PROTOCOL:     Standard imaging protocol performed                RADIATION:     DLP: 426mGy*cm          Automated exposure control was utilized to minimize radiation dose.              FINDINGS:         Lung window images reveal no emphysema.             A tiny 2 mm noncalcified nodule is seen in the right upper lobe on series 3,     image 31. In a       high-risk patient, follow-up CT in 12 months is optional  by Fleischner     criteria.             Mediastinal windows reveal no mediastinal, hilar, or axillary adenopathy.  No     coronary artery       calcifications are visible.              Bony structures have a normal appearance.               CONCLUSION:         CT scan of the chest without IV contrast demonstrating tiny 2 mm noncalcified     nodule right upper       lobe.              ABDIEL COHEN MD             Electronically Signed and Approved By: ABDIEL COHEN MD on 2/27/2019 at 14:50                           Until signed, this is an unconfirmed preliminary report that may contain      errors and is subject to change.                                              COUST:      D:02/27/19 1450            Assessment      Notes      New Medications      * CARIPRAZINE HYDROCHLORIDE (Vraylar) 3 MG CAPSULE: 3 MG PO QDAY #30      * traZODone HCl (Desyrel) 50 MG TABLET: 100 MG PO QDAY #60      * Fluticasone/Vilanterol 100-25 Mcg Inh (Breo Ellipta 100-25 Mcg Inh) 1 EACH B      LST.W.DEV: 1 PUFF INH QDAY #1      * Fluticasone/Vilanterol 100-25 Mcg Inh (Breo Ellipta 100-25 Mcg Inh) 1 EACH B      LST.W.DEV          Sample - Qty 1         Instructions: 1 puff QD         Dx: Chronic obstructive pulmonary disease - J44.9      * Fluticasone/Salmeterol 115/21 (Advair /21 MCG) 12 GM HFA.AER.AD: 2 PUFF      INH RTBID 30 Days #1      * Budesonide/Formoterol Fumarate (Symbicort 80/4.5 Mcg) 10.2 GM HFA.AER.AD: 2       PUFF INH RTBID 30 Days #1      Discontinued Medications      * ARFORMOTEROL TARTRATE (Brovana) 15 MCG/2 ML VIAL.NEB: 15 MCG INH RTQ12H #60         Instructions: DIAGNOSIS CODE REQUIRED PRIOR TO PRESCRIBING.         Dx: Bronchitis, mucopurulent recurrent - J41.1      * Neb-Budesonide (Pulmicort) 0.5 MG/2 ML AMPUL.NEB: 0.5 MG INH BID #60      * UMECLIDINIUM BROMIDE (Incruse Ellipta) 62.5 MCG BLST.W.DEV: 1 PUFF INH RTQDAY       #1      ASSESSMENT:      1. Mild intermittent asthma.        2.  History of chronic obstructive pulmonary disease with chronic bronchitis.      3. History of recurrent mucopurulent bronchitis.       4. Former tobacco user of cigarettes in remission.        5.  Chronic cough improved.       6. Dyspnea on exertion improved.         7. Wheezing improved.             PLAN:      1. I asked the patient to  stop using Brovana and Pulmicort and Incruse. Based     on her pulmonary function tests with reversibility, I feel that her asthma is     playing more of a role. I will start her on Breo 100/25 1 puff daily and use     albuterol as needed. The patient verbalized understanding and we showed her how     to use Breo in the office today and gave her samples.       2. I have reviewed pulmonary function tests and six minute walk test as well as     CT scan with the patient. She has 2 tiny nodules that do not need further follow    up at this time as long as she continues to stay smoke free.       3. We will see the patient back in 4 months to see how she is doing on Breo.            Patient Education      Patient Education Provided:  Acute Asthma, How to use an Inhaler, Lung Cancer                 Disclaimer: Converted document may not contain table formatting or lab diagrams. Please see [employer] for the authenticated document.

## 2021-05-29 LAB — BACTERIA SPEC AEROBE CULT: NORMAL

## 2023-01-24 ENCOUNTER — HOSPITAL ENCOUNTER (OUTPATIENT)
Dept: GENERAL RADIOLOGY | Facility: HOSPITAL | Age: 38
Discharge: HOME | End: 2023-01-24
Admitting: INTERNAL MEDICINE
Payer: COMMERCIAL

## 2023-01-24 ENCOUNTER — TRANSCRIBE ORDERS (OUTPATIENT)
Dept: PULMONOLOGY | Facility: HOSPITAL | Age: 38
End: 2023-01-24
Payer: COMMERCIAL

## 2023-01-24 DIAGNOSIS — J44.9 CHRONIC OBSTRUCTIVE PULMONARY DISEASE, UNSPECIFIED COPD TYPE: Primary | ICD-10-CM

## 2023-01-24 DIAGNOSIS — J44.9 CHRONIC OBSTRUCTIVE PULMONARY DISEASE, UNSPECIFIED COPD TYPE: ICD-10-CM

## 2023-01-24 PROCEDURE — 71046 X-RAY EXAM CHEST 2 VIEWS: CPT

## 2024-05-09 NOTE — PROGRESS NOTES
Primary Care Provider  Rachelle Ibrahim APRN   Referring Provider  DOMINIC Quick      Patient Complaint  Establish Care (New Patient ), COPD, Shortness of Breath, Wheezing, and Cough (Productive- Patient complains that it is sometimes brown, yellow, or green specks )      Subjective          Jillian Segura presents to [facility]      History of Presenting Illness  Jillian Segura is a 39 y.o. female with history of asthma/COPD overlap syndrome, chronic dyspnea, and tobacco use in remission, here to establish care.    Ms. Segura presents as a new patient to our clinic, accompanied by her significant other.  She was previously seen by Dr. Vicente in 2019 but has not followed up in a few years.  Patient denies using any antibiotics or steroids for her lungs recently, denies any fevers or chills, no hospitalizations for her breathing since she was last seen but she has had ER visits.  Patient's main complaints are her dyspnea and cough.  Her cough is productive of brown, yellow, and green flecked sputum.  She will also wheeze intermittently and has a sharp pain on the right side of her back with deep breaths.  Patient's exacerbations usually coincide with the seasons changing.  Unfortunately patient has tried and failed several different maintenance inhalers due to them making her shaky and giving her migraines.  She does sometimes use Breztri which does help with her breathing but still has issues with side effects.  Patient has a Ventolin inhaler that she uses as needed.  She also takes cough syrup intermittently.  She is a former cigarette smoker, 23 pack years.  She currently vapes and has noticed improvement in her breathing since switching from cigarettes.  Patient denies any hemoptysis, swollen lymph nodes, weight loss, or night sweats.  Patient is able to perform ADLs with minor modifications due to dyspnea.  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  I have personally reviewed the review of systems, past family, social, medical and surgical histories; and agree with their findings.      Review of Systems    Review of Systems   Constitutional:  Negative for activity change, chills, fatigue, fever, unexpected weight gain and unexpected weight loss.   HENT:  Negative for congestion, ear discharge, ear pain, mouth sores, postnasal drip, rhinorrhea, sinus pressure, sore throat, swollen glands and trouble swallowing.    Eyes:  Negative for blurred vision, pain, discharge, itching and visual disturbance.   Respiratory:  Positive for cough (productive of brown/yellow/green flecked sputum), shortness of breath (with exertion) and wheezing. Negative for apnea, chest tightness and stridor.    Cardiovascular:  Negative for chest pain, palpitations and leg swelling.   Gastrointestinal:  Negative for abdominal distention, abdominal pain, constipation, diarrhea, nausea, vomiting, GERD and indigestion.   Musculoskeletal:  Negative for arthralgias, joint swelling and myalgias.   Skin:  Negative for color change.   Neurological:  Negative for dizziness, weakness, light-headedness and headache.      Sleep: Negative for Excessive daytime sleepiness  Negative for morning headaches  Negative for Snoring      Family History   Problem Relation Age of Onset    Diabetes Mother     Hypertension Mother     COPD Mother     Cancer Mother         Social History     Socioeconomic History    Marital status: Single   Tobacco Use    Smoking status: Former     Average packs/day: 1 pack/day for 23.0 years (23.0 ttl pk-yrs)     Types: Cigarettes     Start date: 1998    Smokeless tobacco: Never   Vaping Use    Vaping status: Every Day    Substances: Nicotine    Devices: Pre-filled or refillable cartridge   Substance and Sexual Activity    Alcohol use: Never    Drug use: Never    Sexual activity: Defer        Past Medical History:   Diagnosis Date    COPD (chronic  "obstructive pulmonary disease)         Immunization History   Administered Date(s) Administered    Fluzone (or Fluarix & Flulaval for VFC) >6mos 10/05/2021    Hepatitis B Adolescent High Risk Infant 05/05/1999    Td (TDVAX) 05/05/1999       Allergies   Allergen Reactions    Egg-Derived Products GI Intolerance    Sulfa Antibiotics GI Intolerance    Sulfamethoxazole-Trimethoprim GI Intolerance    Zithromax [Azithromycin] GI Intolerance     Stomach cramps/contractions          Current Outpatient Medications:     albuterol sulfate  (90 Base) MCG/ACT inhaler, INHALE 1 PUFF BY MOUTH EVERY 4 HOURS AS NEEDED FOR SHORTNESS OF BREATH, Disp: , Rfl:     benzonatate (TESSALON) 200 MG capsule, Take 1 capsule by mouth 3 (Three) Times a Day As Needed for Cough., Disp: , Rfl:     Budeson-Glycopyrrol-Formoterol (Breztri Aerosphere) 160-9-4.8 MCG/ACT aerosol inhaler, Inhale 2 puffs Every 12 (Twelve) Hours. Barely uses due to giving her the shakes, Disp: 1 each, Rfl: 5    Caplyta 42 MG capsule, Take 1 capsule by mouth Daily., Disp: , Rfl:     clobetasol (TEMOVATE) 0.05 % ointment, apply A thin layer TO THE AFFECTED AREA topically TWICE DAILY, Disp: , Rfl:     Ubrelvy 100 MG tablet, Take 1 tablet by mouth at onset of migraine, may repeat in 2 hours if no relief, max of 2 per day, Disp: , Rfl:     levalbuterol (XOPENEX) 0.63 MG/3ML nebulizer solution, Take 1 ampule by nebulization Every 6 (Six) Hours As Needed for Wheezing or Shortness of Air., Disp: 120 mL, Rfl: 11    predniSONE (DELTASONE) 20 MG tablet, Take 2 tablets by mouth Daily for 5 days., Disp: 10 tablet, Rfl: 0    Trintellix 10 MG tablet tablet, TAKE ONE TABLET BY MOUTH ONCE DAILY AT THE same TIME EACH DAY (Patient not taking: Reported on 5/13/2024), Disp: , Rfl:      Objective     Vital Signs:   BP 99/49 (BP Location: Left arm, Patient Position: Sitting, Cuff Size: Adult)   Pulse 70   Temp 97.9 °F (36.6 °C) (Oral)   Resp 18   Ht 157.5 cm (62\")   Wt 51.2 kg (112 lb " 14.4 oz)   SpO2 99% Comment: room air  BMI 20.65 kg/m²     Physical Exam  Constitutional:       General: She is not in acute distress.     Appearance: Normal appearance. She is normal weight. She is not ill-appearing.   HENT:      Right Ear: Tympanic membrane and ear canal normal.      Left Ear: Tympanic membrane and ear canal normal.      Nose: Nose normal.      Mouth/Throat:      Mouth: Mucous membranes are moist.      Pharynx: Oropharynx is clear.   Eyes:      Extraocular Movements: Extraocular movements intact.      Conjunctiva/sclera: Conjunctivae normal.      Pupils: Pupils are equal, round, and reactive to light.   Cardiovascular:      Rate and Rhythm: Normal rate and regular rhythm.      Pulses: Normal pulses.      Heart sounds: Normal heart sounds.   Pulmonary:      Effort: Pulmonary effort is normal. No respiratory distress.      Breath sounds: No stridor. No wheezing, rhonchi or rales.      Comments: Diminished, tight throughout  Abdominal:      General: Bowel sounds are normal.      Palpations: Abdomen is soft.   Musculoskeletal:         General: No swelling. Normal range of motion.      Cervical back: Normal range of motion and neck supple.      Right lower leg: No edema.      Left lower leg: No edema.   Skin:     General: Skin is warm and dry.   Neurological:      General: No focal deficit present.      Mental Status: She is alert and oriented to person, place, and time.      Motor: No weakness.   Psychiatric:         Mood and Affect: Mood normal.         Behavior: Behavior normal.        Result Review :   I have personally reviewed patient's labs and images.  I also reviewed patient's PCP Rachelle ALFARO's last progress note.            Diagnoses and all orders for this visit:    1. Asthma-COPD overlap syndrome (Primary)  -     CT Chest Without Contrast; Future  -     Alpha - 1 - Antitrypsin Phenotype  -     Budeson-Glycopyrrol-Formoterol ([employer]tri Aerosphere) 160-9-4.8 MCG/ACT aerosol inhaler;  Inhale 2 puffs Every 12 (Twelve) Hours. Barely uses due to giving her the shakes  Dispense: 1 each; Refill: 5  -     levalbuterol (XOPENEX) 0.63 MG/3ML nebulizer solution; Take 1 ampule by nebulization Every 6 (Six) Hours As Needed for Wheezing or Shortness of Air.  Dispense: 120 mL; Refill: 11  -     Home Nebulizer  -     predniSONE (DELTASONE) 20 MG tablet; Take 2 tablets by mouth Daily for 5 days.  Dispense: 10 tablet; Refill: 0    2. Dyspnea, unspecified type  -     CT Chest Without Contrast; Future  -     Alpha - 1 - Antitrypsin Phenotype  -     predniSONE (DELTASONE) 20 MG tablet; Take 2 tablets by mouth Daily for 5 days.  Dispense: 10 tablet; Refill: 0    3. Chronic cough    4. Wheezing    5. Tobacco abuse, in remission       Impression and Plan    -No recent chest imaging, will order chest CT today  -PFTs February 2019 showed moderate obstructive disease, FEV1 69% of predicted with significant response to bronchodilator, 12% improvement in FEV1.  Normal lung volumes, DLCO mildly reduced 68% of predicted.  -Can get updated PFT in the future  -Obtain alpha-1 antitrypsin genotype and levels  -Continue using Breztri twice daily, reminded patient to rinse mouth after each use.  Refills sent to patient's pharmacy.  -Continue using Ventolin inhaler as needed  -Begin using Xopenex nebulizer treatments as needed, prescription sent to patient's pharmacy and nebulizer machine provided in clinic today  -Prednisone burst prescribed for patient to have on hand in case of asthma exacerbation  -Follow-up with MD in about 2 months after testing completed    Smoking status: Reviewed  Vaccination status: Patient reports she is up-to-date with her flu, pneumonia, and Covid vaccines.  Patient is advised to continue to follow CDC recommendations such as social distancing wearing a mask and washing hands for at least 20 seconds.  Medications personally reviewed    Follow Up   No follow-ups on file.  Patient was given instructions  and counseling regarding her condition or for health maintenance advice. Please see specific information pulled into the AVS if appropriate.

## 2024-05-13 ENCOUNTER — OFFICE VISIT (OUTPATIENT)
Dept: PULMONOLOGY | Facility: CLINIC | Age: 39
End: 2024-05-13
Payer: COMMERCIAL

## 2024-05-13 VITALS
HEIGHT: 62 IN | SYSTOLIC BLOOD PRESSURE: 99 MMHG | TEMPERATURE: 97.9 F | BODY MASS INDEX: 20.78 KG/M2 | OXYGEN SATURATION: 99 % | DIASTOLIC BLOOD PRESSURE: 49 MMHG | HEART RATE: 70 BPM | RESPIRATION RATE: 18 BRPM | WEIGHT: 112.9 LBS

## 2024-05-13 DIAGNOSIS — R06.00 DYSPNEA, UNSPECIFIED TYPE: ICD-10-CM

## 2024-05-13 DIAGNOSIS — F17.201 TOBACCO ABUSE, IN REMISSION: ICD-10-CM

## 2024-05-13 DIAGNOSIS — J44.89 ASTHMA-COPD OVERLAP SYNDROME: Primary | ICD-10-CM

## 2024-05-13 DIAGNOSIS — R06.2 WHEEZING: ICD-10-CM

## 2024-05-13 DIAGNOSIS — R05.3 CHRONIC COUGH: ICD-10-CM

## 2024-05-13 PROCEDURE — 99204 OFFICE O/P NEW MOD 45 MIN: CPT

## 2024-05-13 PROCEDURE — 1160F RVW MEDS BY RX/DR IN RCRD: CPT

## 2024-05-13 PROCEDURE — 1159F MED LIST DOCD IN RCRD: CPT

## 2024-05-13 RX ORDER — METHYLPREDNISOLONE 4 MG/1
TABLET ORAL
COMMUNITY
Start: 2024-02-28 | End: 2024-05-13

## 2024-05-13 RX ORDER — LEVALBUTEROL INHALATION SOLUTION 0.63 MG/3ML
3 SOLUTION RESPIRATORY (INHALATION) EVERY 6 HOURS PRN
Qty: 120 ML | Refills: 11 | Status: SHIPPED | OUTPATIENT
Start: 2024-05-13

## 2024-05-13 RX ORDER — BUDESONIDE, GLYCOPYRROLATE, AND FORMOTEROL FUMARATE 160; 9; 4.8 UG/1; UG/1; UG/1
AEROSOL, METERED RESPIRATORY (INHALATION) EVERY 12 HOURS SCHEDULED
COMMUNITY
End: 2024-05-13 | Stop reason: SDUPTHER

## 2024-05-13 RX ORDER — VORTIOXETINE 10 MG/1
TABLET, FILM COATED ORAL
COMMUNITY
Start: 2024-03-19

## 2024-05-13 RX ORDER — LUMATEPERONE 42 MG/1
1 CAPSULE ORAL DAILY
COMMUNITY
Start: 2024-04-02

## 2024-05-13 RX ORDER — LEVOFLOXACIN 500 MG/1
TABLET, FILM COATED ORAL
COMMUNITY
Start: 2024-03-19 | End: 2024-05-13

## 2024-05-13 RX ORDER — BENZONATATE 200 MG/1
200 CAPSULE ORAL 3 TIMES DAILY PRN
COMMUNITY
Start: 2024-02-28

## 2024-05-13 RX ORDER — CLOBETASOL PROPIONATE 0.5 MG/G
OINTMENT TOPICAL
COMMUNITY
Start: 2024-04-27

## 2024-05-13 RX ORDER — UBROGEPANT 100 MG/1
TABLET ORAL
COMMUNITY
Start: 2024-04-26

## 2024-05-13 RX ORDER — BUDESONIDE, GLYCOPYRROLATE, AND FORMOTEROL FUMARATE 160; 9; 4.8 UG/1; UG/1; UG/1
2 AEROSOL, METERED RESPIRATORY (INHALATION) EVERY 12 HOURS SCHEDULED
Qty: 1 EACH | Refills: 5 | Status: SHIPPED | OUTPATIENT
Start: 2024-05-13

## 2024-05-13 RX ORDER — PREDNISONE 20 MG/1
40 TABLET ORAL DAILY
Qty: 10 TABLET | Refills: 0 | Status: SHIPPED | OUTPATIENT
Start: 2024-05-13 | End: 2024-05-18

## 2024-05-14 ENCOUNTER — TELEPHONE (OUTPATIENT)
Dept: PULMONOLOGY | Facility: CLINIC | Age: 39
End: 2024-05-14
Payer: COMMERCIAL

## 2024-05-14 NOTE — TELEPHONE ENCOUNTER
PA for Breztri approved. Called and spoke with pharmacy it went through with a $0 copay. Called and notified patient.

## 2024-05-20 ENCOUNTER — PATIENT ROUNDING (BHMG ONLY) (OUTPATIENT)
Dept: PULMONOLOGY | Facility: CLINIC | Age: 39
End: 2024-05-20
Payer: COMMERCIAL

## 2024-05-20 NOTE — PROGRESS NOTES
May 20, 2024    Hello, may I speak with Jillian Segura?    My name is Adela      I am  [facility]  [address]  [address]  [place] [address] [phone].    Before we get started may I verify your date of birth? 1985    I am calling to officially welcome you to our practice and ask about your recent visit. Is this a good time to talk? My chart message sent for patient rounding.

## 2024-05-22 ENCOUNTER — TELEPHONE (OUTPATIENT)
Dept: PULMONOLOGY | Facility: CLINIC | Age: 39
End: 2024-05-22
Payer: COMMERCIAL

## 2024-05-22 ENCOUNTER — TELEPHONE (OUTPATIENT)
Dept: PULMONOLOGY | Facility: CLINIC | Age: 39
End: 2024-05-22

## 2024-05-23 DIAGNOSIS — R06.00 DYSPNEA, UNSPECIFIED TYPE: ICD-10-CM

## 2024-05-23 DIAGNOSIS — J44.89 ASTHMA-COPD OVERLAP SYNDROME: Primary | ICD-10-CM

## 2024-06-26 ENCOUNTER — PREP FOR SURGERY (OUTPATIENT)
Dept: OTHER | Facility: HOSPITAL | Age: 39
End: 2024-06-26
Payer: COMMERCIAL

## 2024-06-26 ENCOUNTER — OFFICE VISIT (OUTPATIENT)
Dept: UROLOGY | Facility: CLINIC | Age: 39
End: 2024-06-26
Payer: COMMERCIAL

## 2024-06-26 VITALS
HEIGHT: 62 IN | BODY MASS INDEX: 21.27 KG/M2 | SYSTOLIC BLOOD PRESSURE: 109 MMHG | DIASTOLIC BLOOD PRESSURE: 62 MMHG | WEIGHT: 115.6 LBS

## 2024-06-26 DIAGNOSIS — N32.81 OAB (OVERACTIVE BLADDER): Primary | ICD-10-CM

## 2024-06-26 DIAGNOSIS — N35.92 STRICTURE OF FEMALE URETHRA, UNSPECIFIED STRICTURE TYPE: ICD-10-CM

## 2024-06-26 LAB
BILIRUB BLD-MCNC: NEGATIVE MG/DL
CLARITY, POC: CLEAR
COLOR UR: YELLOW
EXPIRATION DATE: NORMAL
GLUCOSE UR STRIP-MCNC: NEGATIVE MG/DL
KETONES UR QL: NEGATIVE
LEUKOCYTE EST, POC: NEGATIVE
Lab: NORMAL
NITRITE UR-MCNC: NEGATIVE MG/ML
PH UR: 6 [PH] (ref 5–8)
PROT UR STRIP-MCNC: NEGATIVE MG/DL
RBC # UR STRIP: NEGATIVE /UL
SP GR UR: 1.02 (ref 1–1.03)
UROBILINOGEN UR QL: NORMAL

## 2024-06-26 RX ORDER — SODIUM CHLORIDE 9 MG/ML
100 INJECTION, SOLUTION INTRAVENOUS CONTINUOUS
OUTPATIENT
Start: 2024-06-26

## 2024-06-26 RX ORDER — SODIUM CHLORIDE 0.9 % (FLUSH) 0.9 %
10 SYRINGE (ML) INJECTION AS NEEDED
OUTPATIENT
Start: 2024-06-26

## 2024-06-26 RX ORDER — SODIUM CHLORIDE 0.9 % (FLUSH) 0.9 %
10 SYRINGE (ML) INJECTION EVERY 12 HOURS SCHEDULED
OUTPATIENT
Start: 2024-06-26

## 2024-06-26 RX ORDER — SODIUM CHLORIDE 9 MG/ML
40 INJECTION, SOLUTION INTRAVENOUS AS NEEDED
OUTPATIENT
Start: 2024-06-26

## 2024-06-26 RX ORDER — IBUPROFEN 200 MG
200 TABLET ORAL EVERY 6 HOURS PRN
COMMUNITY

## 2024-06-26 NOTE — H&P
[facility]   Urology HISTORY AND PHYSICAL    Patient Name: Jillian Segura  : 1985  MRN: [id number]  Primary Care Physician:  Rachelle Ibrahim APRN  Date of admission: (Not on file)    Subjective   Subjective       History of Present Illness  Patient is urethral stenosis and overactive bladder presents for cystoscopy, possible urethral dilation, bladder Botox.      Personal History     Past Medical History:   Diagnosis Date    COPD (chronic obstructive pulmonary disease)        Past Surgical History:   Procedure Laterality Date    ECTOPIC PREGNANCY SURGERY      TONSILLECTOMY      TUBAL ABDOMINAL LIGATION         Family History: family history includes COPD in her mother; Cancer in her mother; Diabetes in her mother; Hypertension in her mother. Otherwise pertinent FHx was reviewed and not pertinent to current issue.    Social History:  reports that she has quit smoking. Her smoking use included cigarettes. She started smoking about 26 years ago. She has a 23 pack-year smoking history. She has never used smokeless tobacco. She reports that she does not drink alcohol and does not use drugs.    Home Medications:  Budeson-Glycopyrrol-Formoterol, Lumateperone Tosylate, Vortioxetine HBr, albuterol sulfate HFA, benzonatate, clobetasol, ibuprofen, levalbuterol, and ubrogepant    Allergies:  Allergies   Allergen Reactions    Sulfamethoxazole-Trimethoprim GI Intolerance    Zithromax [Azithromycin] GI Intolerance     Stomach cramps/contractions    Egg-Derived Products GI Intolerance    Sulfa Antibiotics GI Intolerance       Objective    Objective     Vitals:   BP: (109)/(62) 109/62    Physical Exam  Constitutional:       Appearance: Normal appearance.   Cardiovascular:      Rate and Rhythm: Normal rate and regular rhythm.   Pulmonary:      Effort: Pulmonary effort is normal.      Breath sounds: Normal breath sounds.   Neurological:      Mental Status: She is alert. Mental status is at baseline.   Psychiatric:          Mood and Affect: Mood and affect normal.         Speech: Speech normal.         Judgment: Judgment normal.         Result Review    Result Review:  I have personally reviewed the results from the time of this admission to 6/26/2024 15:32 EDT and agree with these findings:  [x]  Laboratory  []  Microbiology  [x]  Radiology  []  EKG/Telemetry   []  Cardiology/Vascular   []  Pathology  [x]  Old records  []  Other:      Assessment & Plan   Assessment / Plan       Active Hospital Problems:  There are no active hospital problems to display for this patient.      Plan: cystoscopy, possible urethral dilation, bladder Botox  Risks and benefits discussed with patient and they are agreeable to proceed.    VTE Prophylaxis:  No VTE prophylaxis order currently exists.        CODE STATUS:           Electronically signed by Linda Melendez MD, 06/26/24, 3:32 PM EDT.

## 2024-06-26 NOTE — H&P (VIEW-ONLY)
[facility]   Urology HISTORY AND PHYSICAL    Patient Name: Jillian Segura  : 1985  MRN: [id number]  Primary Care Physician:  Rachelle Ibrahim APRN  Date of admission: (Not on file)    Subjective   Subjective       History of Present Illness  Patient is urethral stenosis and overactive bladder presents for cystoscopy, possible urethral dilation, bladder Botox.      Personal History     Past Medical History:   Diagnosis Date    COPD (chronic obstructive pulmonary disease)        Past Surgical History:   Procedure Laterality Date    ECTOPIC PREGNANCY SURGERY      TONSILLECTOMY      TUBAL ABDOMINAL LIGATION         Family History: family history includes COPD in her mother; Cancer in her mother; Diabetes in her mother; Hypertension in her mother. Otherwise pertinent FHx was reviewed and not pertinent to current issue.    Social History:  reports that she has quit smoking. Her smoking use included cigarettes. She started smoking about 26 years ago. She has a 23 pack-year smoking history. She has never used smokeless tobacco. She reports that she does not drink alcohol and does not use drugs.    Home Medications:  Budeson-Glycopyrrol-Formoterol, Lumateperone Tosylate, Vortioxetine HBr, albuterol sulfate HFA, benzonatate, clobetasol, ibuprofen, levalbuterol, and ubrogepant    Allergies:  Allergies   Allergen Reactions    Sulfamethoxazole-Trimethoprim GI Intolerance    Zithromax [Azithromycin] GI Intolerance     Stomach cramps/contractions    Egg-Derived Products GI Intolerance    Sulfa Antibiotics GI Intolerance       Objective    Objective     Vitals:   BP: (109)/(62) 109/62    Physical Exam  Constitutional:       Appearance: Normal appearance.   Cardiovascular:      Rate and Rhythm: Normal rate and regular rhythm.   Pulmonary:      Effort: Pulmonary effort is normal.      Breath sounds: Normal breath sounds.   Neurological:      Mental Status: She is alert. Mental status is at baseline.   Psychiatric:          Mood and Affect: Mood and affect normal.         Speech: Speech normal.         Judgment: Judgment normal.         Result Review    Result Review:  I have personally reviewed the results from the time of this admission to 6/26/2024 15:32 EDT and agree with these findings:  [x]  Laboratory  []  Microbiology  [x]  Radiology  []  EKG/Telemetry   []  Cardiology/Vascular   []  Pathology  [x]  Old records  []  Other:      Assessment & Plan   Assessment / Plan       Active Hospital Problems:  There are no active hospital problems to display for this patient.      Plan: cystoscopy, possible urethral dilation, bladder Botox  Risks and benefits discussed with patient and they are agreeable to proceed.    VTE Prophylaxis:  No VTE prophylaxis order currently exists.        CODE STATUS:           Electronically signed by Linda Melendez MD, 06/26/24, 3:32 PM EDT.

## 2024-06-26 NOTE — PROGRESS NOTES
"Chief Complaint  Establish Care (Dysuria)    Subjective          Jillian Segura presents to [facility] UROLOGY    History of Present Illness  Patient presents to schedule bladder Botox.  Her last 1 was in 2019 and she stated once last for at least 2 to 3 years.  Over the last 6 to 12 months she has been having increased frequency urgency and bladder spasms.  We did have issues getting the scope into her urethra at the last visit and she request to have it done in the operating room.  Urinalysis is normal.      Objective   Vital Signs:   /62   Ht 157.5 cm (62\")   Wt 52.4 kg (115 lb 9.6 oz)   BMI 21.14 kg/m²       Physical Exam  Vitals and nursing note reviewed.   Constitutional:       Appearance: Normal appearance. She is well-developed.   Pulmonary:      Effort: Pulmonary effort is normal.      Breath sounds: Normal air entry.   Neurological:      Mental Status: She is alert and oriented to person, place, and time.      Motor: Motor function is intact.   Psychiatric:         Mood and Affect: Mood normal.         Behavior: Behavior normal.          Result Review :   The following data was reviewed by: Linda Melendez MD on 06/26/2024:    Results for orders placed or performed in visit on 06/26/24   POC Urinalysis Dipstick, Automated    Specimen: Urine   Result Value Ref Range    Color Yellow Yellow, Straw, Dark Yellow, Kirti    Clarity, UA Clear Clear    Specific Gravity  1.020 1.005 - 1.030    pH, Urine 6.0 5.0 - 8.0    Leukocytes Negative Negative    Nitrite, UA Negative Negative    Protein, POC Negative Negative mg/dL    Glucose, UA Negative Negative mg/dL    Ketones, UA Negative Negative    Urobilinogen, UA 0.2 E.U./dL Normal, 0.2 E.U./dL    Bilirubin Negative Negative    Blood, UA Negative Negative    Lot Number [id number]     Expiration Date 05/31/25               Assessment and Plan    Diagnoses and all orders for this visit:    1. OAB (overactive bladder) (Primary)  -     POC " Urinalysis Dipstick, Automated    Patient will have a bladder Botox done in the operating room.  Will also do urethral dilation if needed.  Risk and benefits of stents and she is agreeable to proceed.        Follow Up       No follow-ups on file.  Patient was given instructions and counseling regarding her condition or for health maintenance advice. Please see specific information pulled into the AVS if appropriate.

## 2024-06-27 PROBLEM — N35.92 STRICTURE OF FEMALE URETHRA: Status: ACTIVE | Noted: 2024-06-26

## 2024-07-08 ENCOUNTER — OFFICE VISIT (OUTPATIENT)
Dept: PULMONOLOGY | Facility: CLINIC | Age: 39
End: 2024-07-08
Payer: COMMERCIAL

## 2024-07-08 VITALS
WEIGHT: 120 LBS | HEIGHT: 62 IN | OXYGEN SATURATION: 98 % | DIASTOLIC BLOOD PRESSURE: 56 MMHG | RESPIRATION RATE: 18 BRPM | TEMPERATURE: 98.7 F | SYSTOLIC BLOOD PRESSURE: 112 MMHG | HEART RATE: 72 BPM | BODY MASS INDEX: 22.08 KG/M2

## 2024-07-08 DIAGNOSIS — J45.20 MILD INTERMITTENT ASTHMA WITHOUT COMPLICATION: Primary | ICD-10-CM

## 2024-07-08 DIAGNOSIS — Z87.891 FORMER TOBACCO USE: ICD-10-CM

## 2024-07-08 DIAGNOSIS — Z72.0 VAPES NICOTINE CONTAINING SUBSTANCE: ICD-10-CM

## 2024-07-08 DIAGNOSIS — J43.8 OTHER EMPHYSEMA: ICD-10-CM

## 2024-07-08 DIAGNOSIS — R91.8 LUNG NODULES: ICD-10-CM

## 2024-07-08 PROCEDURE — 1160F RVW MEDS BY RX/DR IN RCRD: CPT | Performed by: STUDENT IN AN ORGANIZED HEALTH CARE EDUCATION/TRAINING PROGRAM

## 2024-07-08 PROCEDURE — 99214 OFFICE O/P EST MOD 30 MIN: CPT | Performed by: STUDENT IN AN ORGANIZED HEALTH CARE EDUCATION/TRAINING PROGRAM

## 2024-07-08 PROCEDURE — 1159F MED LIST DOCD IN RCRD: CPT | Performed by: STUDENT IN AN ORGANIZED HEALTH CARE EDUCATION/TRAINING PROGRAM

## 2024-07-08 RX ORDER — ROFLUMILAST 500 UG/1
500 TABLET ORAL DAILY
Qty: 30 TABLET | Refills: 5 | Status: SHIPPED | OUTPATIENT
Start: 2024-07-08 | End: 2024-07-11 | Stop reason: SDUPTHER

## 2024-07-08 NOTE — PROGRESS NOTES
Primary Care Provider  Rachelle Ibrahim APRN   Referring Provider  No ref. provider found      Patient Complaint  Follow-up (1 Month) and Asthma-COPD overlap syndrome      Subjective          Jillian Segura presents to [facility] PULMONARY & CRITICAL CARE MEDICINE      History of Presenting Illness  Jillian Segura is a 39 y.o. female with history of asthma/COPD overlap, chronic dyspnea on exertion, former tobacco use here for follow-up.    Since seeing Cordelia last month patient's symptoms have been relatively the same.  She is unable to tolerate Breztri or albuterol because of the side effects of beta agonist causing her migraines.  She will use albuterol if she has to, recently up to twice a day.  She feels like it does help but due to her headaches she is limited.  I told her that mostly every treatment for her COPD/asthma includes a beta agonist component.  She expressed understanding.  She also tried Spiriva in the past which also causes worsening headaches as well.  Patient is a former smoker, quit 2021.  She does vape currently.  She is trying to quit.  She has pulmonary function test scheduled for August.  She also had a chest CT ordered but was denied by insurance.  She denies fever, chills, hemoptysis.  She refused all vaccinations at this time. I have personally reviewed the review of systems, past family, social, medical and surgical histories; and agree with their findings.    Review of Systems  Constitutional:  No fever. No chills. No weakness.  ENT:  No sore throat, URI symptoms.   Cardiovascular:  No chest pain. No palpitations. No lower extremity edema.  Respiratory:  No shortness of breath, +chronic cough, pleuritic chest pain. No hemoptysis. No dyspnea.   GI:  Normal appetite. No nausea, vomiting, diarrhea. No melena.  Musculoskeletal:  No arthralgias or myalgias.  Neurologic:  No weakness    Family History   Problem Relation Age of Onset    Diabetes Mother     Hypertension  Mother     COPD Mother     Cancer Mother         Social History     Socioeconomic History    Marital status: Single   Tobacco Use    Smoking status: Former     Average packs/day: 1 pack/day for 23.0 years (23.0 ttl pk-yrs)     Types: Cigarettes     Start date: 1998    Smokeless tobacco: Never   Vaping Use    Vaping status: Every Day    Substances: Nicotine    Devices: Pre-filled or refillable cartridge   Substance and Sexual Activity    Alcohol use: Never    Drug use: Never    Sexual activity: Yes     Partners: Male     Birth control/protection: Tubal ligation        Past Medical History:   Diagnosis Date    COPD (chronic obstructive pulmonary disease)         Immunization History   Administered Date(s) Administered    Fluzone (or Fluarix & Flulaval for VFC) >6mos 10/05/2021    Hepatitis B Adolescent High Risk Infant 05/05/1999    Td (TDVAX) 05/05/1999       Allergies   Allergen Reactions    Sulfamethoxazole-Trimethoprim GI Intolerance    Zithromax [Azithromycin] GI Intolerance     Stomach cramps/contractions    Egg-Derived Products GI Intolerance    Sulfa Antibiotics GI Intolerance          Current Outpatient Medications:     albuterol sulfate  (90 Base) MCG/ACT inhaler, INHALE 1 PUFF BY MOUTH EVERY 4 HOURS AS NEEDED FOR SHORTNESS OF BREATH, Disp: , Rfl:     Budeson-Glycopyrrol-Formoterol (Breztri Aerosphere) 160-9-4.8 MCG/ACT aerosol inhaler, Inhale 2 puffs Every 12 (Twelve) Hours. Barely uses due to giving her the shakes, Disp: 1 each, Rfl: 5    Caplyta 42 MG capsule, Take 1 capsule by mouth Daily., Disp: , Rfl:     ibuprofen (Motrin IB) 200 MG tablet, Take 1 tablet by mouth Every 6 (Six) Hours As Needed for Mild Pain., Disp: , Rfl:     levalbuterol (XOPENEX) 0.63 MG/3ML nebulizer solution, Take 1 ampule by nebulization Every 6 (Six) Hours As Needed for Wheezing or Shortness of Air., Disp: 120 mL, Rfl: 11    Ubrelvy 100 MG tablet, Take 1 tablet by mouth at onset of migraine, may repeat in 2 hours if no  "relief, max of 2 per day, Disp: , Rfl:      Objective     Vital Signs:   /56 (BP Location: Left arm, Patient Position: Sitting, Cuff Size: Adult)   Pulse 72   Temp 98.7 °F (37.1 °C) (Temporal)   Resp 18   Ht 157.5 cm (62\")   Wt 54.4 kg (120 lb)   SpO2 98% Comment: Room air  BMI 21.95 kg/m²     Physical Exam  Vital Signs Reviewed   WDWN, Alert, NAD.    HEENT:  EOMI.  nares clear  Neck:  Supple, no JVD, no thyromegaly  Chest:  clear to auscultation bilaterally, no wheezes, crackles; no work of breathing noted  CV: RRR, no M/G/R, pulses 2+, equal.  Abd:  Soft, NT, ND, +BS  EXT:  no clubbing, no cyanosis, no edema  Neuro:  A&Ox3, CN grossly intact, no focal deficits.  Skin: No rashes or lesions noted       Result Review :   I have personally reviewed patient's labs and images.              Patient Active Problem List   Diagnosis    OAB (overactive bladder)    Stricture of female urethra       Impression and Plan    Asthma/COPD overlap, FEV1 69% predicted  Vape usage  Chronic cough  Former smoker, quit 2021  Lung nodules    -Patient does not tolerate beta agonists well, causing worsening chronic headaches/migraines.  She will use albuterol/Breztri if she has to.  She has also tried a solo LAMA agent such as Spiriva without significant improvements  -Continue Breztri twice daily and as needed albuterol for now  -Will add Daliresp to see if it will help with her symptoms  -PFTs February 2019 showed moderate obstructive disease, FEV1 69% of predicted with significant response to bronchodilator, 12% improvement in FEV1.  Normal lung volumes, DLCO mildly reduced 68% of predicted.  She has a repeat PFTs in a month  -Will order repeat chest CT noncontrast to follow-up her lung nodules  Encouraged vape cessation  -Follow-up in 4 to 5 months or earlier as needed      Smoking status: Reviewed  Vaccination status: Patient refused all vaccinations at this time  Medications personally reviewed    Follow Up   No " follow-ups on file.  Patient was given instructions and counseling regarding her condition or for health maintenance advice. Please see specific information pulled into the AVS if appropriate.

## 2024-07-11 DIAGNOSIS — J45.20 MILD INTERMITTENT ASTHMA WITHOUT COMPLICATION: ICD-10-CM

## 2024-07-11 DIAGNOSIS — J43.8 OTHER EMPHYSEMA: ICD-10-CM

## 2024-07-15 RX ORDER — ROFLUMILAST 500 UG/1
500 TABLET ORAL DAILY
Qty: 30 TABLET | Refills: 5 | Status: SHIPPED | OUTPATIENT
Start: 2024-07-15

## 2024-07-15 NOTE — TELEPHONE ENCOUNTER
Patients insurance will not cover the Roflumilast, is there anything else that we can try. Please advise

## 2024-07-17 ENCOUNTER — TELEPHONE (OUTPATIENT)
Dept: PULMONOLOGY | Facility: CLINIC | Age: 39
End: 2024-07-17
Payer: COMMERCIAL

## 2024-07-17 NOTE — TELEPHONE ENCOUNTER
Patient was informed that their insurance will not cover their Roflumilast, is there anything else that we could use in place of it. Please advise

## 2024-07-18 ENCOUNTER — ANESTHESIA EVENT (OUTPATIENT)
Dept: PERIOP | Facility: HOSPITAL | Age: 39
End: 2024-07-18
Payer: COMMERCIAL

## 2024-07-18 ENCOUNTER — ANESTHESIA (OUTPATIENT)
Dept: PERIOP | Facility: HOSPITAL | Age: 39
End: 2024-07-18
Payer: COMMERCIAL

## 2024-07-18 ENCOUNTER — HOSPITAL ENCOUNTER (OUTPATIENT)
Facility: HOSPITAL | Age: 39
Setting detail: HOSPITAL OUTPATIENT SURGERY
Discharge: HOME | End: 2024-07-18
Attending: UROLOGY | Admitting: UROLOGY
Payer: COMMERCIAL

## 2024-07-18 VITALS
HEART RATE: 63 BPM | SYSTOLIC BLOOD PRESSURE: 106 MMHG | OXYGEN SATURATION: 100 % | HEIGHT: 62 IN | BODY MASS INDEX: 21.22 KG/M2 | DIASTOLIC BLOOD PRESSURE: 62 MMHG | TEMPERATURE: 97.2 F | WEIGHT: 115.3 LBS | RESPIRATION RATE: 16 BRPM

## 2024-07-18 DIAGNOSIS — N32.81 OAB (OVERACTIVE BLADDER): ICD-10-CM

## 2024-07-18 DIAGNOSIS — N35.92 STRICTURE OF FEMALE URETHRA, UNSPECIFIED STRICTURE TYPE: ICD-10-CM

## 2024-07-18 PROCEDURE — 25010000002 MIDAZOLAM PER 1MG: Performed by: ANESTHESIOLOGY

## 2024-07-18 PROCEDURE — 25010000002 FENTANYL CITRATE (PF) 50 MCG/ML SOLUTION: Performed by: NURSE ANESTHETIST, CERTIFIED REGISTERED

## 2024-07-18 PROCEDURE — 25010000002 GLYCOPYRROLATE 0.2 MG/ML SOLUTION: Performed by: NURSE ANESTHETIST, CERTIFIED REGISTERED

## 2024-07-18 PROCEDURE — 25810000003 LACTATED RINGERS PER 1000 ML: Performed by: ANESTHESIOLOGY

## 2024-07-18 PROCEDURE — 25010000002 ONABOTULINUMTOXINA 100 UNITS RECONSTITUTED SOLUTION: Performed by: UROLOGY

## 2024-07-18 PROCEDURE — 25010000002 PROPOFOL 10 MG/ML EMULSION: Performed by: NURSE ANESTHETIST, CERTIFIED REGISTERED

## 2024-07-18 PROCEDURE — 52281 CYSTOSCOPY AND TREATMENT: CPT | Performed by: UROLOGY

## 2024-07-18 PROCEDURE — 25010000002 CEFAZOLIN PER 500 MG: Performed by: UROLOGY

## 2024-07-18 RX ORDER — OXYCODONE HYDROCHLORIDE 5 MG/1
5 TABLET ORAL
Status: DISCONTINUED | OUTPATIENT
Start: 2024-07-18 | End: 2024-07-18 | Stop reason: HOSPADM

## 2024-07-18 RX ORDER — SODIUM CHLORIDE 9 MG/ML
100 INJECTION, SOLUTION INTRAVENOUS CONTINUOUS
Status: DISCONTINUED | OUTPATIENT
Start: 2024-07-18 | End: 2024-07-18 | Stop reason: HOSPADM

## 2024-07-18 RX ORDER — SODIUM CHLORIDE 9 MG/ML
40 INJECTION, SOLUTION INTRAVENOUS AS NEEDED
Status: DISCONTINUED | OUTPATIENT
Start: 2024-07-18 | End: 2024-07-18 | Stop reason: HOSPADM

## 2024-07-18 RX ORDER — SODIUM CHLORIDE, SODIUM LACTATE, POTASSIUM CHLORIDE, CALCIUM CHLORIDE 600; 310; 30; 20 MG/100ML; MG/100ML; MG/100ML; MG/100ML
9 INJECTION, SOLUTION INTRAVENOUS CONTINUOUS PRN
Status: DISCONTINUED | OUTPATIENT
Start: 2024-07-18 | End: 2024-07-18 | Stop reason: HOSPADM

## 2024-07-18 RX ORDER — PROMETHAZINE HYDROCHLORIDE 12.5 MG/1
25 TABLET ORAL ONCE AS NEEDED
Status: DISCONTINUED | OUTPATIENT
Start: 2024-07-18 | End: 2024-07-18 | Stop reason: HOSPADM

## 2024-07-18 RX ORDER — SODIUM CHLORIDE 0.9 % (FLUSH) 0.9 %
10 SYRINGE (ML) INJECTION AS NEEDED
Status: DISCONTINUED | OUTPATIENT
Start: 2024-07-18 | End: 2024-07-18 | Stop reason: HOSPADM

## 2024-07-18 RX ORDER — SODIUM CHLORIDE 0.9 % (FLUSH) 0.9 %
10 SYRINGE (ML) INJECTION EVERY 12 HOURS SCHEDULED
Status: DISCONTINUED | OUTPATIENT
Start: 2024-07-18 | End: 2024-07-18 | Stop reason: HOSPADM

## 2024-07-18 RX ORDER — FENTANYL CITRATE 50 UG/ML
INJECTION, SOLUTION INTRAMUSCULAR; INTRAVENOUS AS NEEDED
Status: DISCONTINUED | OUTPATIENT
Start: 2024-07-18 | End: 2024-07-18 | Stop reason: SURG

## 2024-07-18 RX ORDER — ACETAMINOPHEN 325 MG/1
650 TABLET ORAL ONCE
Qty: 2 TABLET | Refills: 0 | Status: DISCONTINUED | OUTPATIENT
Start: 2024-07-18 | End: 2024-07-18 | Stop reason: HOSPADM

## 2024-07-18 RX ORDER — PROMETHAZINE HYDROCHLORIDE 25 MG/1
25 SUPPOSITORY RECTAL ONCE AS NEEDED
Status: DISCONTINUED | OUTPATIENT
Start: 2024-07-18 | End: 2024-07-18 | Stop reason: HOSPADM

## 2024-07-18 RX ORDER — IBUPROFEN 600 MG/1
600 TABLET ORAL EVERY 6 HOURS PRN
Status: DISCONTINUED | OUTPATIENT
Start: 2024-07-18 | End: 2024-07-18 | Stop reason: HOSPADM

## 2024-07-18 RX ORDER — PHENYLEPHRINE HCL IN 0.9% NACL 1 MG/10 ML
SYRINGE (ML) INTRAVENOUS AS NEEDED
Status: DISCONTINUED | OUTPATIENT
Start: 2024-07-18 | End: 2024-07-18 | Stop reason: SURG

## 2024-07-18 RX ORDER — ACETAMINOPHEN 500 MG
1000 TABLET ORAL ONCE
Status: COMPLETED | OUTPATIENT
Start: 2024-07-18 | End: 2024-07-18

## 2024-07-18 RX ORDER — PROPOFOL 10 MG/ML
VIAL (ML) INTRAVENOUS AS NEEDED
Status: DISCONTINUED | OUTPATIENT
Start: 2024-07-18 | End: 2024-07-18 | Stop reason: SURG

## 2024-07-18 RX ORDER — MIDAZOLAM HYDROCHLORIDE 2 MG/2ML
2 INJECTION, SOLUTION INTRAMUSCULAR; INTRAVENOUS ONCE
Status: COMPLETED | OUTPATIENT
Start: 2024-07-18 | End: 2024-07-18

## 2024-07-18 RX ORDER — GLYCOPYRROLATE 0.2 MG/ML
INJECTION INTRAMUSCULAR; INTRAVENOUS AS NEEDED
Status: DISCONTINUED | OUTPATIENT
Start: 2024-07-18 | End: 2024-07-18 | Stop reason: SURG

## 2024-07-18 RX ORDER — MEPERIDINE HYDROCHLORIDE 25 MG/ML
12.5 INJECTION INTRAMUSCULAR; INTRAVENOUS; SUBCUTANEOUS
Status: DISCONTINUED | OUTPATIENT
Start: 2024-07-18 | End: 2024-07-18 | Stop reason: HOSPADM

## 2024-07-18 RX ORDER — LIDOCAINE HYDROCHLORIDE 20 MG/ML
INJECTION, SOLUTION EPIDURAL; INFILTRATION; INTRACAUDAL; PERINEURAL AS NEEDED
Status: DISCONTINUED | OUTPATIENT
Start: 2024-07-18 | End: 2024-07-18 | Stop reason: SURG

## 2024-07-18 RX ORDER — ONDANSETRON 2 MG/ML
4 INJECTION INTRAMUSCULAR; INTRAVENOUS ONCE AS NEEDED
Status: DISCONTINUED | OUTPATIENT
Start: 2024-07-18 | End: 2024-07-18 | Stop reason: HOSPADM

## 2024-07-18 RX ORDER — PROMETHAZINE HYDROCHLORIDE 12.5 MG/1
12.5 TABLET ORAL ONCE AS NEEDED
Status: DISCONTINUED | OUTPATIENT
Start: 2024-07-18 | End: 2024-07-18 | Stop reason: HOSPADM

## 2024-07-18 RX ADMIN — SODIUM CHLORIDE, POTASSIUM CHLORIDE, SODIUM LACTATE AND CALCIUM CHLORIDE 9 ML/HR: 600; 310; 30; 20 INJECTION, SOLUTION INTRAVENOUS at 08:34

## 2024-07-18 RX ADMIN — GLYCOPYRROLATE 0.2 MG: 0.2 INJECTION INTRAMUSCULAR; INTRAVENOUS at 10:22

## 2024-07-18 RX ADMIN — Medication 200 MCG: at 10:23

## 2024-07-18 RX ADMIN — SODIUM CHLORIDE 2000 MG: 9 INJECTION, SOLUTION INTRAVENOUS at 09:56

## 2024-07-18 RX ADMIN — Medication 200 MCG: at 10:08

## 2024-07-18 RX ADMIN — LIDOCAINE HYDROCHLORIDE 60 MG: 20 INJECTION, SOLUTION INTRAVENOUS at 09:53

## 2024-07-18 RX ADMIN — PROPOFOL 75 MCG/KG/MIN: 10 INJECTION, EMULSION INTRAVENOUS at 09:53

## 2024-07-18 RX ADMIN — ACETAMINOPHEN 1000 MG: 500 TABLET ORAL at 08:34

## 2024-07-18 RX ADMIN — PROPOFOL 50 MG: 10 INJECTION, EMULSION INTRAVENOUS at 09:53

## 2024-07-18 RX ADMIN — Medication 200 MCG: at 10:14

## 2024-07-18 RX ADMIN — PROPOFOL 50 MG: 10 INJECTION, EMULSION INTRAVENOUS at 10:07

## 2024-07-18 RX ADMIN — FENTANYL CITRATE 50 MCG: 50 INJECTION, SOLUTION INTRAMUSCULAR; INTRAVENOUS at 10:07

## 2024-07-18 RX ADMIN — MIDAZOLAM HYDROCHLORIDE 2 MG: 1 INJECTION, SOLUTION INTRAMUSCULAR; INTRAVENOUS at 09:41

## 2024-07-18 RX ADMIN — PROPOFOL 40 MG: 10 INJECTION, EMULSION INTRAVENOUS at 10:01

## 2024-07-18 NOTE — OP NOTE
CYSTOSCOPY WITH URETHRAL DILATATION, CYSTOSCOPY BOTOX INJECTION OF BLADDER  Procedure Report    Patient Name:  Jillian Segura  YOB: 1985    Date of Surgery:  7/18/2024     Pre-op Diagnosis:   OAB (overactive bladder) [N32.81]  Stricture of female urethra, unspecified stricture type [N35.92]       Post-Op Diagnosis Codes:     * OAB (overactive bladder) [N32.81]     * Stricture of female urethra, unspecified stricture type [N35.92]      Procedure/CPT® Codes:    Procedure(s):  URETHRAL DILATATION  CYSTOSCOPY BOTOX INJECTION OF BLADDER      Staff:  Surgeon(s):  Linda Melendez MD         Anesthesia: Choice    Estimated Blood Loss: 2 mL    Implants:    Nothing was implanted during the procedure    Specimen:          None        Complications: None    Description of Procedure:     After proper consent was obtained, patient was taken to operating room and MAC anesthesia was performed.  The patient was placed in the dorsal lithotomy position and prepped and draped in the normal sterile fashion for cystoscopy.       A urethral dilation was performed using female sounds starting at 14 Czech.  This was continued with sounds up to 32 Czech.  There was no injury to the urethra noted.       The flexible cystoscope was inserted. Bladder was inspected and no abnormalities seen. 20 injection sites on the posterior bladder wall were injected with 0.5 cc of botox for a total of 100 units. No complications. The cystoscope was removed. The patient tolerated the procedure well with no bleeding.     The patient tolerated the procedure well and was transferred to the PACU in stable condition.           Linda Melendez MD     Date: 7/18/2024  Time: 10:44 EDT

## 2024-07-18 NOTE — ANESTHESIA POSTPROCEDURE EVALUATION
Patient: Jillian Segura    Procedure Summary       Date: 07/18/24 Room / Location: [facility] OR 06 / [facility] MAIN OR    Anesthesia Start: 0951 Anesthesia Stop: 1024    Procedures:       URETHRAL DILATATION (Urethra)      CYSTOSCOPY BOTOX INJECTION OF BLADDER Diagnosis:       OAB (overactive bladder)      Stricture of female urethra, unspecified stricture type      (OAB (overactive bladder) [N32.81])      (Stricture of female urethra, unspecified stricture type [N35.92])    Surgeons: Linda Melendez MD Provider: Shante Richardson MD    Anesthesia Type: general, MAC ASA Status: 2            Anesthesia Type: general, MAC    Vitals  Vitals Value Taken Time   /54 07/18/24 1050   Temp 36.3 °C (97.3 °F) 07/18/24 1050   Pulse 65 07/18/24 1055   Resp 18 07/18/24 1050   SpO2 100 % 07/18/24 1055   Vitals shown include unfiled device data.        Post Anesthesia Care and Evaluation    Patient location during evaluation: bedside  Patient participation: complete - patient participated  Level of consciousness: awake  Pain management: adequate    Airway patency: patent  PONV Status: none  Cardiovascular status: acceptable and stable  Respiratory status: acceptable  Hydration status: acceptable

## 2024-07-18 NOTE — ANESTHESIA PREPROCEDURE EVALUATION
Anesthesia Evaluation     Patient summary reviewed and Nursing notes reviewed   no history of anesthetic complications:   NPO Solid Status: > 8 hours  NPO Liquid Status: > 2 hours           Airway   Mallampati: I  TM distance: >3 FB  Neck ROM: full  No difficulty expected  Dental    (+) upper dentures    Pulmonary - normal exam    breath sounds clear to auscultation  (+) a smoker (nicotine) Current, Smoked day of surgery, vape, COPD, asthma,  Cardiovascular - negative cardio ROS and normal exam  Exercise tolerance: good (4-7 METS)    Rhythm: regular  Rate: normal        Neuro/Psych- negative ROS  GI/Hepatic/Renal/Endo - negative ROS     Musculoskeletal (-) negative ROS    Abdominal  - normal exam   Substance History - negative use     OB/GYN negative ob/gyn ROS         Other - negative ROS       ROS/Med Hx Other: PAT Nursing Notes unavailable.                 Anesthesia Plan    ASA 2     general and MAC     (Patient understands anesthesia not responsible for dental damage.)  intravenous induction     Anesthetic plan, risks, benefits, and alternatives have been provided, discussed and informed consent has been obtained with: patient and spouse/significant other.    Plan discussed with CRNA.      CODE STATUS:

## 2024-07-22 ENCOUNTER — OFFICE VISIT (OUTPATIENT)
Dept: UROLOGY | Facility: CLINIC | Age: 39
End: 2024-07-22
Payer: COMMERCIAL

## 2024-07-22 DIAGNOSIS — N32.81 OAB (OVERACTIVE BLADDER): Primary | ICD-10-CM

## 2024-07-22 LAB
BILIRUB BLD-MCNC: NEGATIVE MG/DL
CLARITY, POC: CLEAR
COLOR UR: YELLOW
EXPIRATION DATE: NORMAL
GLUCOSE UR STRIP-MCNC: NEGATIVE MG/DL
KETONES UR QL: NEGATIVE
LEUKOCYTE EST, POC: NEGATIVE
Lab: NORMAL
NITRITE UR-MCNC: NEGATIVE MG/ML
PH UR: 5.5 [PH] (ref 5–8)
PROT UR STRIP-MCNC: NEGATIVE MG/DL
RBC # UR STRIP: NEGATIVE /UL
SP GR UR: 1.02 (ref 1–1.03)
URINE VOLUME: NORMAL
UROBILINOGEN UR QL: NORMAL

## 2024-07-22 PROCEDURE — 81003 URINALYSIS AUTO W/O SCOPE: CPT | Performed by: UROLOGY

## 2024-07-22 PROCEDURE — 51798 US URINE CAPACITY MEASURE: CPT | Performed by: UROLOGY

## 2024-07-22 NOTE — PROGRESS NOTES
Patient is here for follow-up of bladder Botox.  I was running behind.  PVR was performed which was 0.  She states the Botox is working well.    Electronically signed by Linda Melendez MD, 07/22/24, 6:03 PM EDT.

## 2024-08-08 ENCOUNTER — TELEPHONE (OUTPATIENT)
Dept: UROLOGY | Facility: CLINIC | Age: 39
End: 2024-08-08
Payer: COMMERCIAL

## 2024-08-08 ENCOUNTER — HOSPITAL ENCOUNTER (OUTPATIENT)
Dept: RESPIRATORY THERAPY | Facility: HOSPITAL | Age: 39
Discharge: HOME | End: 2024-08-08
Payer: COMMERCIAL

## 2024-08-08 DIAGNOSIS — R06.00 DYSPNEA, UNSPECIFIED TYPE: ICD-10-CM

## 2024-08-08 DIAGNOSIS — N30.00 ACUTE CYSTITIS WITHOUT HEMATURIA: Primary | ICD-10-CM

## 2024-08-08 DIAGNOSIS — J44.89 ASTHMA-COPD OVERLAP SYNDROME: ICD-10-CM

## 2024-08-08 PROCEDURE — 94060 EVALUATION OF WHEEZING: CPT

## 2024-08-08 PROCEDURE — 94726 PLETHYSMOGRAPHY LUNG VOLUMES: CPT

## 2024-08-08 PROCEDURE — 94729 DIFFUSING CAPACITY: CPT

## 2024-08-08 RX ORDER — ALBUTEROL SULFATE 2.5 MG/3ML
2.5 SOLUTION RESPIRATORY (INHALATION) ONCE
Status: COMPLETED | OUTPATIENT
Start: 2024-08-08 | End: 2024-08-08

## 2024-08-08 RX ADMIN — ALBUTEROL SULFATE 2.5 MG: 2.5 SOLUTION RESPIRATORY (INHALATION) at 12:55

## 2024-08-08 NOTE — TELEPHONE ENCOUNTER
PATIENT CALLED AND SAID SHE HAD BOTOX AND URETHRAL DILATATION.  SHE SAID SHE HAS A UTI THAT WON'T GO AWAY.  SHE HAS PAIN AND BURNING AND DISCOMFORT.  HER KIDNEY IS HURTING.  SHE TOOK AT HOME UTI TEST, AND IT CAME BACK POSITIVE FOR INFECTION.    SHE ASKED FOR AN APPOINTMENT WITH DR. CHIRAG GONZALEZ#[phone]

## 2024-08-08 NOTE — TELEPHONE ENCOUNTER
Spoke with patient informing her that Dr. Melendez is out of the office. She does not need to be seen for uti symptoms. Usually an order for a urine culture is placed into a patient's chart, then they can stop by the lab and leave a urine specimen. An order for a urine specimen is in her chart and she will go to the lab tomorrow. I explained to the patient that it will take 48 hours before the results are sent to the office, so it will not be reviewed until Monday.

## 2024-08-09 ENCOUNTER — LAB (OUTPATIENT)
Dept: LAB | Facility: HOSPITAL | Age: 39
End: 2024-08-09
Payer: COMMERCIAL

## 2024-08-09 DIAGNOSIS — N30.00 ACUTE CYSTITIS WITHOUT HEMATURIA: ICD-10-CM

## 2024-08-09 PROCEDURE — 87086 URINE CULTURE/COLONY COUNT: CPT

## 2024-08-11 LAB — BACTERIA SPEC AEROBE CULT: NO GROWTH

## 2024-08-13 ENCOUNTER — HOSPITAL ENCOUNTER (OUTPATIENT)
Dept: CT IMAGING | Facility: HOSPITAL | Age: 39
Discharge: HOME | End: 2024-08-13
Admitting: STUDENT IN AN ORGANIZED HEALTH CARE EDUCATION/TRAINING PROGRAM
Payer: COMMERCIAL

## 2024-08-13 DIAGNOSIS — R91.8 LUNG NODULES: ICD-10-CM

## 2024-08-13 PROCEDURE — 71250 CT THORAX DX C-: CPT

## 2024-10-07 ENCOUNTER — TELEPHONE (OUTPATIENT)
Dept: PULMONOLOGY | Facility: CLINIC | Age: 39
End: 2024-10-07

## 2024-10-07 NOTE — TELEPHONE ENCOUNTER
Hub staff attempted to follow warm transfer process and was unsuccessful     Caller: VICENTE AGUILERA    Relationship to patient: SELF    Best call back number: [phone]    Patient is needing: PATIENT IS CALLING BECAUSE HER BREATHING IS GETTING HEAVY AND SHE HAS CONGESTION GOING ON AND SHE THINKS SHE IS GETTING A RESPIRATORY INFECTION GOING ON. IS WANTING TO KNOW IF SHE CAN HAVE MEDS CALLED IN OR IF SHE NEEDS AN APPOINTMENT. PLEASE CALL PATIENT BACK        
Acute scheduled for 10:15AM with Cordelia on 10/8.   
Yes - the patient is able to be screened

## 2024-10-08 ENCOUNTER — OFFICE VISIT (OUTPATIENT)
Dept: PULMONOLOGY | Facility: CLINIC | Age: 39
End: 2024-10-08
Payer: COMMERCIAL

## 2024-10-08 VITALS
TEMPERATURE: 97.8 F | OXYGEN SATURATION: 100 % | DIASTOLIC BLOOD PRESSURE: 42 MMHG | HEART RATE: 72 BPM | BODY MASS INDEX: 22.08 KG/M2 | HEIGHT: 62 IN | RESPIRATION RATE: 18 BRPM | WEIGHT: 120 LBS | SYSTOLIC BLOOD PRESSURE: 96 MMHG

## 2024-10-08 DIAGNOSIS — J45.20 MILD INTERMITTENT ASTHMA WITHOUT COMPLICATION: ICD-10-CM

## 2024-10-08 DIAGNOSIS — J43.8 OTHER EMPHYSEMA: ICD-10-CM

## 2024-10-08 DIAGNOSIS — J45.901 MODERATE ASTHMA WITH EXACERBATION, UNSPECIFIED WHETHER PERSISTENT: ICD-10-CM

## 2024-10-08 DIAGNOSIS — R06.2 WHEEZING: ICD-10-CM

## 2024-10-08 DIAGNOSIS — R06.00 DYSPNEA, UNSPECIFIED TYPE: ICD-10-CM

## 2024-10-08 DIAGNOSIS — J44.89 ASTHMA-COPD OVERLAP SYNDROME: Primary | ICD-10-CM

## 2024-10-08 DIAGNOSIS — R05.3 CHRONIC COUGH: ICD-10-CM

## 2024-10-08 DIAGNOSIS — F17.201 TOBACCO ABUSE, IN REMISSION: ICD-10-CM

## 2024-10-08 RX ORDER — DOXYCYCLINE 100 MG/1
100 CAPSULE ORAL 2 TIMES DAILY
Qty: 20 CAPSULE | Refills: 0 | Status: SHIPPED | OUTPATIENT
Start: 2024-10-08

## 2024-10-08 RX ORDER — PREDNISONE 10 MG/1
TABLET ORAL
Qty: 31 TABLET | Refills: 0 | Status: SHIPPED | OUTPATIENT
Start: 2024-10-08

## 2024-10-08 RX ORDER — METHYLPREDNISOLONE SODIUM SUCCINATE 125 MG/2ML
62.5 INJECTION, POWDER, LYOPHILIZED, FOR SOLUTION INTRAMUSCULAR; INTRAVENOUS ONCE
Status: COMPLETED | OUTPATIENT
Start: 2024-10-08 | End: 2024-10-08

## 2024-10-08 RX ORDER — ROFLUMILAST 500 UG/1
500 TABLET ORAL DAILY
Qty: 30 TABLET | Refills: 5 | Status: SHIPPED | OUTPATIENT
Start: 2024-10-08

## 2024-10-08 RX ADMIN — METHYLPREDNISOLONE SODIUM SUCCINATE 62.5 MG: 125 INJECTION, POWDER, LYOPHILIZED, FOR SOLUTION INTRAMUSCULAR; INTRAVENOUS at 11:36

## 2024-10-08 NOTE — PROGRESS NOTES
Primary Care Provider  Rachelle Ibrahim APRN   Referring Provider  No ref. provider found      Patient Complaint  Follow-up (Acute), Shortness of Breath, and Cough      Subjective          Jillian Segura presents to [facility]      History of Presenting Illness  Jillian Segura is a 39 y.o. female patient of Dr. Rollins with asthma/COPD overlap, lung nodule, chronic dyspnea, and tobacco use in remission, here for acute visit.    Patient presents today with increased shortness of breath, productive cough, fatigue, and congestion.  Her cough is productive of clear to light yellow sputum.  The symptoms have been present for the past 3-4 days.  This is typical for patient, as she will usually get exacerbations during the changing of seasons.  Patient denies using any antibiotics or steroids for her lungs recently, denies any fevers or chills, no ER visits or hospitalizations for her breathing since she was last seen.  At baseline, patient has mild exertional dyspnea and a chronic cough.  She will also wheeze intermittently.  Unfortunately patient has tried and failed several different maintenance inhalers due to them making her shaky and giving her migraines.  She intermittently uses Breztri which does help with her breathing but still has issues with side effects.  Patient has a Ventolin inhaler and Xopenex nebulizer treatments to use as needed.  At her last visit, patient was prescribed Daliresp but she has not yet started taking as there was some question from the pharmacy.  She is a former cigarette smoker, quit 3 years ago, 23 pack years.  She currently vapes and has noticed improvement in her breathing since switching from cigarettes.  Patient denies any hemoptysis, swollen lymph nodes, weight loss, or night sweats.  Patient is able to perform ADLs with minor modifications due to dyspnea.  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  I have personally reviewed the review of systems, past family, social, medical and surgical histories; and agree with their findings.      Review of Systems    Review of Systems   Constitutional:  Negative for activity change, chills, fatigue, fever, unexpected weight gain and unexpected weight loss.   HENT:  Negative for congestion, ear discharge, ear pain, mouth sores, postnasal drip, rhinorrhea, sinus pressure, sore throat, swollen glands and trouble swallowing.    Eyes:  Negative for blurred vision, pain, discharge, itching and visual disturbance.   Respiratory:  Positive for cough (productive of light/clear sputum), chest tightness, shortness of breath (worse than baseline) and wheezing (intermittent). Negative for apnea and stridor.    Cardiovascular:  Negative for chest pain, palpitations and leg swelling.   Gastrointestinal:  Negative for abdominal distention, abdominal pain, constipation, diarrhea, nausea, vomiting, GERD and indigestion.   Musculoskeletal:  Negative for arthralgias, joint swelling and myalgias.   Skin:  Negative for color change.   Neurological:  Negative for dizziness, weakness, light-headedness and headache.      Sleep: Negative for Excessive daytime sleepiness  Negative for morning headaches  Negative for Snoring      Family History   Problem Relation Age of Onset    Diabetes Mother     Hypertension Mother     COPD Mother     Cancer Mother         Social History     Socioeconomic History    Marital status: Single   Tobacco Use    Smoking status: Former     Current packs/day: 0.00     Average packs/day: 1 pack/day for 23.0 years (23.0 ttl pk-yrs)     Types: Cigarettes     Start date: 1998     Quit date: 2021     Years since quitting: 3.7    Smokeless tobacco: Never   Vaping Use    Vaping status: Every Day    Substances: Nicotine    Devices: Pre-filled or refillable cartridge   Substance and Sexual Activity    Alcohol use: Never    Drug use: Never    Sexual activity: Yes      Partners: Male     Birth control/protection: Tubal ligation        Past Medical History:   Diagnosis Date    Arthritis     Asthma     COPD (chronic obstructive pulmonary disease)     Lung nodules 07/08/2024    Other emphysema 07/08/2024        Immunization History   Administered Date(s) Administered    Fluzone (or Fluarix & Flulaval for VFC) >6mos 10/05/2021    Hepatitis B Adolescent High Risk Infant 05/05/1999    Td (TDVAX) 05/05/1999       Allergies   Allergen Reactions    Sulfamethoxazole-Trimethoprim GI Intolerance    Zithromax [Azithromycin] GI Intolerance     Stomach cramps/contractions    Egg-Derived Products GI Intolerance    Sulfa Antibiotics GI Intolerance          Current Outpatient Medications:     albuterol sulfate  (90 Base) MCG/ACT inhaler, INHALE 1 PUFF BY MOUTH EVERY 4 HOURS AS NEEDED FOR SHORTNESS OF BREATH, Disp: , Rfl:     Budeson-Glycopyrrol-Formoterol (Breztri Aerosphere) 160-9-4.8 MCG/ACT aerosol inhaler, Inhale 2 puffs Every 12 (Twelve) Hours. Barely uses due to giving her the shakes, Disp: 1 each, Rfl: 5    Caplyta 42 MG capsule, Take 1 capsule by mouth Daily., Disp: , Rfl:     ibuprofen (Motrin IB) 200 MG tablet, Take 1 tablet by mouth Every 6 (Six) Hours As Needed for Mild Pain., Disp: , Rfl:     levalbuterol (XOPENEX) 0.63 MG/3ML nebulizer solution, Take 1 ampule by nebulization Every 6 (Six) Hours As Needed for Wheezing or Shortness of Air., Disp: 120 mL, Rfl: 11    roflumilast (Daliresp) 500 MCG tablet tablet, Take 1 tablet by mouth Daily., Disp: 30 tablet, Rfl: 5    Ubrelvy 100 MG tablet, Take 1 tablet by mouth at onset of migraine, may repeat in 2 hours if no relief, max of 2 per day, Disp: , Rfl:     doxycycline (VIBRAMYCIN) 100 MG capsule, Take 1 capsule by mouth 2 (Two) Times a Day., Disp: 20 capsule, Rfl: 0    predniSONE (DELTASONE) 10 MG tablet, Take 4 tabs daily x 3 days, then take 3 tabs daily x 3 days, then take 2 tabs daily x 3 days, then take 1 tab daily x 3 days,  "Disp: 31 tablet, Rfl: 0  No current facility-administered medications for this visit.     Objective     Vital Signs:   BP 96/42 (BP Location: Left arm, Patient Position: Sitting, Cuff Size: Adult)   Pulse 72   Temp 97.8 °F (36.6 °C) (Oral)   Resp 18   Ht 157.5 cm (62\")   Wt 54.4 kg (120 lb)   SpO2 100% Comment: Room air  BMI 21.95 kg/m²     Physical Exam  Constitutional:       General: She is not in acute distress.     Appearance: Normal appearance. She is normal weight. She is not ill-appearing.   HENT:      Right Ear: Tympanic membrane and ear canal normal.      Left Ear: Tympanic membrane and ear canal normal.      Nose: Nose normal.      Mouth/Throat:      Mouth: Mucous membranes are moist.      Pharynx: Oropharynx is clear.   Eyes:      Extraocular Movements: Extraocular movements intact.      Conjunctiva/sclera: Conjunctivae normal.      Pupils: Pupils are equal, round, and reactive to light.   Cardiovascular:      Rate and Rhythm: Normal rate and regular rhythm.      Pulses: Normal pulses.      Heart sounds: Normal heart sounds.   Pulmonary:      Effort: Pulmonary effort is normal. No respiratory distress.      Breath sounds: No stridor. Rhonchi present. No wheezing or rales.      Comments: Diminished, tight throughout  Abdominal:      General: Bowel sounds are normal.      Palpations: Abdomen is soft.   Musculoskeletal:         General: No swelling. Normal range of motion.      Cervical back: Normal range of motion and neck supple.      Right lower leg: No edema.      Left lower leg: No edema.   Skin:     General: Skin is warm and dry.   Neurological:      General: No focal deficit present.      Mental Status: She is alert and oriented to person, place, and time.      Motor: No weakness.   Psychiatric:         Mood and Affect: Mood normal.         Behavior: Behavior normal.        Result Review :   I have personally reviewed patient's labs and images.  I also reviewed Dr. Rollins's last office note " 7/8/2024.       Diagnoses and all orders for this visit:    1. Asthma-COPD overlap syndrome (Primary)  -     methylPREDNISolone sodium succinate (SOLU-Medrol) injection 62.5 mg  -     predniSONE (DELTASONE) 10 MG tablet; Take 4 tabs daily x 3 days, then take 3 tabs daily x 3 days, then take 2 tabs daily x 3 days, then take 1 tab daily x 3 days  Dispense: 31 tablet; Refill: 0  -     doxycycline (VIBRAMYCIN) 100 MG capsule; Take 1 capsule by mouth 2 (Two) Times a Day.  Dispense: 20 capsule; Refill: 0    2. Mild intermittent asthma without complication  -     roflumilast (Daliresp) 500 MCG tablet tablet; Take 1 tablet by mouth Daily.  Dispense: 30 tablet; Refill: 5    3. Moderate asthma with exacerbation, unspecified whether persistent  -     methylPREDNISolone sodium succinate (SOLU-Medrol) injection 62.5 mg  -     predniSONE (DELTASONE) 10 MG tablet; Take 4 tabs daily x 3 days, then take 3 tabs daily x 3 days, then take 2 tabs daily x 3 days, then take 1 tab daily x 3 days  Dispense: 31 tablet; Refill: 0  -     doxycycline (VIBRAMYCIN) 100 MG capsule; Take 1 capsule by mouth 2 (Two) Times a Day.  Dispense: 20 capsule; Refill: 0    4. Other emphysema  -     roflumilast (Daliresp) 500 MCG tablet tablet; Take 1 tablet by mouth Daily.  Dispense: 30 tablet; Refill: 5    5. Dyspnea, unspecified type    6. Chronic cough    7. Wheezing    8. Tobacco abuse, in remission      Impression and Plan    -Alpha-1 antitrypsin genotype MM, 134 mg/dL  -PFTs 8/8/2024 showed mild obstructive defect, FEV1 72% of predicted.  No significant response to bronchodilator.  Air trapping present, normal DLCO.  Previous PFTs 2019 showed a significant response to bronchodilator.  -CT chest 8/13/2024 showed a stable 2 mm right upper lobe nodule.  Lungs clear.  No new or suspicious pulmonary nodules.  -Doxycycline x 10 days, Solu-Medrol injection, and prednisone taper prescribed today for COPD/asthma exacerbation  -Continue using Breztri twice daily  as needed, reminded patient to rinse mouth after each use.  She only uses Breztri intermittently due to side effects.  -Continue using Ventolin inhaler and Xopenex nebulizer treatments as needed  -Continue taking Daliresp 500 mcg daily, refills sent to patient's pharmacy  -Patient to keep regularly scheduled appointment with Dr. Rollins 11/27/2024    Smoking status: Reviewed  Vaccination status: Patient reports she is up-to-date with her flu, pneumonia, and Covid vaccines.  Patient is advised to continue to follow CDC recommendations such as social distancing wearing a mask and washing hands for at least 20 seconds.  Medications personally reviewed    Follow Up   No follow-ups on file.  Patient was given instructions and counseling regarding her condition or for health maintenance advice. Please see specific information pulled into the AVS if appropriate.

## 2024-11-14 NOTE — PROGRESS NOTES
Primary Care Provider  Rachelle Ibrahim APRN   Referring Provider  No ref. provider found      Patient Complaint  Emphysema, Follow-up (4 month), Asthma, Cough, Shortness of Breath, and Wheezing      Subjective          Jillian Segura presents to [facility] MEDIC[facility]      History of Presenting Illness  Jillian Segura is a 39 y.o. female patient of Dr. Rollins with asthma/COPD overlap, lung nodule, chronic dyspnea, and tobacco use in remission, here for 1 month follow up.    Patient presents today with increased shortness of breath, productive cough, chest tightness, and leg swelling.  Patient reports that her symptoms have been present despite multiple rounds of antibiotics and steroids recently.  Normally, patient has exacerbations during the changing of seasons.  Patient denies any fevers or chills, no ER visits or hospitalizations for her breathing since she was last seen.  At baseline, patient has mild exertional dyspnea and a chronic cough.  She will also wheeze intermittently.  Unfortunately patient has tried and failed several different maintenance inhalers due to them making her shaky and giving her migraines.  She intermittently uses Breztri which does help with her breathing but still has issues with side effects.  Patient has a Ventolin inhaler and Xopenex nebulizer treatments to use as needed.  She takes Daliresp 500 mcg daily, which she reports helps her the most.  She is a former cigarette smoker, quit 3 years ago, 23 pack years.  She currently vapes and has noticed improvement in her breathing since switching from cigarettes.  Patient denies any hemoptysis, swollen lymph nodes, weight loss, or night sweats.  Patient is able to perform ADLs with minor modifications due to dyspnea.  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  I have personally reviewed the review of systems, past family, social, medical and surgical  histories; and agree with their findings.      Review of Systems    Review of Systems   Constitutional:  Negative for activity change, chills, fatigue, fever, unexpected weight gain and unexpected weight loss.   HENT:  Negative for congestion, ear discharge, ear pain, mouth sores, postnasal drip, rhinorrhea, sinus pressure, sore throat, swollen glands and trouble swallowing.    Eyes:  Negative for blurred vision, pain, discharge, itching and visual disturbance.   Respiratory:  Positive for cough (productive), chest tightness, shortness of breath (worse than baseline) and wheezing (intermittent). Negative for apnea and stridor.    Cardiovascular:  Positive for leg swelling. Negative for chest pain and palpitations.   Gastrointestinal:  Negative for abdominal distention, abdominal pain, constipation, diarrhea, nausea, vomiting, GERD and indigestion.   Musculoskeletal:  Negative for arthralgias, joint swelling and myalgias.   Skin:  Negative for color change.   Neurological:  Negative for dizziness, weakness, light-headedness and headache.      Sleep: Negative for Excessive daytime sleepiness  Negative for morning headaches  Negative for Snoring      Family History   Problem Relation Age of Onset    Diabetes Mother     Hypertension Mother     COPD Mother     Cancer Mother         Social History     Socioeconomic History    Marital status: Single   Tobacco Use    Smoking status: Former     Current packs/day: 0.00     Average packs/day: 1 pack/day for 23.0 years (23.0 ttl pk-yrs)     Types: Cigarettes     Start date: 1998     Quit date: 2021     Years since quitting: 3.8    Smokeless tobacco: Never   Vaping Use    Vaping status: Every Day    Substances: Nicotine    Devices: Pre-filled or refillable cartridge   Substance and Sexual Activity    Alcohol use: Never    Drug use: Never    Sexual activity: Yes     Partners: Male     Birth control/protection: Tubal ligation        Past Medical History:   Diagnosis Date     Arthritis     Asthma     COPD (chronic obstructive pulmonary disease)     Lung nodules 07/08/2024    Other emphysema 07/08/2024        Immunization History   Administered Date(s) Administered    Fluzone (or Fluarix & Flulaval for VFC) >6mos 10/05/2021    Hepatitis B Adolescent High Risk Infant 05/05/1999    Td (TDVAX) 05/05/1999       Allergies   Allergen Reactions    Sulfamethoxazole-Trimethoprim GI Intolerance    Zithromax [Azithromycin] GI Intolerance     Stomach cramps/contractions    Egg-Derived Products GI Intolerance    Sulfa Antibiotics GI Intolerance          Current Outpatient Medications:     albuterol sulfate  (90 Base) MCG/ACT inhaler, INHALE 1 PUFF BY MOUTH EVERY 4 HOURS AS NEEDED FOR SHORTNESS OF BREATH, Disp: , Rfl:     Budeson-Glycopyrrol-Formoterol (Breztri Aerosphere) 160-9-4.8 MCG/ACT aerosol inhaler, Inhale 2 puffs Every 12 (Twelve) Hours. Barely uses due to giving her the shakes, Disp: 1 each, Rfl: 5    Caplyta 42 MG capsule, Take 1 capsule by mouth Daily., Disp: , Rfl:     Chest Congestion Relief DM  MG/5ML syrup, TAKE 10 ML BY MOUTH EVERY 4 HOURS AS NEEDED FOR 7 DAYS, Disp: , Rfl:     levalbuterol (XOPENEX) 0.63 MG/3ML nebulizer solution, Take 1 ampule by nebulization Every 6 (Six) Hours As Needed for Wheezing or Shortness of Air., Disp: 120 mL, Rfl: 11    Pseudoephedrine-guaiFENesin (GUAIFEN PSE PO), Guaifen-PSE, Disp: , Rfl:     roflumilast (Daliresp) 500 MCG tablet tablet, Take 1 tablet by mouth Daily., Disp: 30 tablet, Rfl: 5    Ubrelvy 100 MG tablet, Take 1 tablet by mouth at onset of migraine, may repeat in 2 hours if no relief, max of 2 per day, Disp: , Rfl:     budesonide-formoterol (Symbicort) 160-4.5 MCG/ACT inhaler, Every 12 (Twelve) Hours. (Patient not taking: Reported on 11/15/2024), Disp: , Rfl:     doxycycline (VIBRAMYCIN) 100 MG capsule, Take 1 capsule by mouth 2 (Two) Times a Day. (Patient not taking: Reported on 11/15/2024), Disp: 20 capsule, Rfl: 0     "fluticasone (FLONASE) 50 MCG/ACT nasal spray, instill 1 TO 2 SPRAYS IN EACH nostril DAILY AS NEEDED (Patient not taking: Reported on 11/15/2024), Disp: , Rfl:     ibuprofen (Motrin IB) 200 MG tablet, Take 1 tablet by mouth Every 6 (Six) Hours As Needed for Mild Pain. (Patient not taking: Reported on 11/15/2024), Disp: , Rfl:     Loratadine-D 24HR  MG per 24 hr tablet, Take 1 tablet by mouth Daily. (Patient not taking: Reported on 11/15/2024), Disp: , Rfl:     predniSONE (DELTASONE) 10 MG tablet, Take 4 tabs daily x 3 days, then take 3 tabs daily x 3 days, then take 2 tabs daily x 3 days, then take 1 tab daily x 3 days (Patient not taking: Reported on 11/15/2024), Disp: 31 tablet, Rfl: 0    Vicks Sinex 12 Hour Decongest 0.05 % nasal spray, instill 2 SPRAYS IN EACH nostril TWICE DAILY IN THE MORNING AND IN THE EVENING (Patient not taking: Reported on 11/15/2024), Disp: , Rfl:      Objective     Vital Signs:   /58 (BP Location: Left arm, Patient Position: Sitting, Cuff Size: Adult)   Pulse 85   Temp 98.2 °F (36.8 °C) (Temporal)   Resp 14   Ht 157.5 cm (62\")   Wt 54.4 kg (120 lb)   SpO2 100% Comment: room air  BMI 21.95 kg/m²     Physical Exam  Constitutional:       General: She is not in acute distress.     Appearance: Normal appearance. She is normal weight. She is ill-appearing.   HENT:      Right Ear: Tympanic membrane and ear canal normal.      Left Ear: Tympanic membrane and ear canal normal.      Nose: Nose normal.      Mouth/Throat:      Mouth: Mucous membranes are moist.      Pharynx: Oropharynx is clear.   Eyes:      Extraocular Movements: Extraocular movements intact.      Conjunctiva/sclera: Conjunctivae normal.      Pupils: Pupils are equal, round, and reactive to light.   Cardiovascular:      Rate and Rhythm: Normal rate and regular rhythm.      Pulses: Normal pulses.      Heart sounds: Normal heart sounds.   Pulmonary:      Effort: Pulmonary effort is normal. No respiratory distress.    "   Breath sounds: No stridor. Rhonchi present. No wheezing or rales.      Comments: Diminished, tight throughout  Abdominal:      General: Bowel sounds are normal.      Palpations: Abdomen is soft.   Musculoskeletal:         General: No swelling. Normal range of motion.      Cervical back: Normal range of motion and neck supple.      Right lower leg: No edema.      Left lower leg: No edema.   Skin:     General: Skin is warm and dry.   Neurological:      General: No focal deficit present.      Mental Status: She is alert and oriented to person, place, and time.      Motor: No weakness.   Psychiatric:         Mood and Affect: Mood normal.         Behavior: Behavior normal.        Result Review :   I have personally reviewed patient's labs and images.  I also reviewed my last progress note 10/8/2024.       Diagnoses and all orders for this visit:    1. Asthma-COPD overlap syndrome (Primary)  -     Bronchial Challenge With Methacholine; Future  -     IgE Level; Future  -     CBC & Differential; Future  -     Allergens, Zone 8; Future  -     Fungitell B-D Glucan; Future  -     QuantiFERON TB Gold; Future  -     Aspergillus Galactomannan Antigen - , Arm, Right; Future  -     Histoplasma Antigen ser; Future    2. Dyspnea, unspecified type  -     XR Chest 2 View; Future  -     Bronchial Challenge With Methacholine; Future  -     IgE Level; Future  -     CBC & Differential; Future  -     Allergens, Zone 8; Future  -     Fungitell B-D Glucan; Future  -     QuantiFERON TB Gold; Future  -     Aspergillus Galactomannan Antigen - , Arm, Right; Future  -     Histoplasma Antigen ser; Future    3. Orthopnea  -     XR Chest 2 View; Future    4. Other emphysema    5. Chronic cough  -     XR Chest 2 View; Future    6. Wheezing    7. Tobacco abuse, in remission      Impression and Plan    -Alpha-1 antitrypsin genotype MM, 134 mg/dL  -PFTs 8/8/2024 showed mild obstructive defect, FEV1 72% of predicted.  No significant response to  bronchodilator.  Air trapping present, normal DLCO.  Previous PFTs 2019 showed a significant response to bronchodilator.  -CT chest 8/13/2024 showed a stable 2 mm right upper lobe nodule.  Lungs clear.  No new or suspicious pulmonary nodules.  -Check CXR today to rule out pneumonia, effusion, or pulmonary edema  -Discussed possible biologic therapy for better asthma control.  Will order methacholine challenge, infectious workup, CBC with differential, IgE, and allergy panel to determine whether patient meets criteria.  -Continue using Breztri twice daily as needed, reminded patient to rinse mouth after each use.  She only uses Breztri intermittently due to side effects of migraine and shakiness.  -Continue using Ventolin inhaler and Xopenex nebulizer treatments as needed  -Continue taking Daliresp 500 mcg daily  -Follow up in 1 month for recheck of acute symptoms    Smoking status: Reviewed  Vaccination status: Patient reports she is up-to-date with her flu, pneumonia, and Covid vaccines.  Patient is advised to continue to follow CDC recommendations such as social distancing wearing a mask and washing hands for at least 20 seconds.  Medications personally reviewed    Follow Up   No follow-ups on file.  Patient was given instructions and counseling regarding her condition or for health maintenance advice. Please see specific information pulled into the AVS if appropriate.

## 2024-11-15 ENCOUNTER — OFFICE VISIT (OUTPATIENT)
Dept: PULMONOLOGY | Facility: CLINIC | Age: 39
End: 2024-11-15
Payer: COMMERCIAL

## 2024-11-15 VITALS
HEIGHT: 62 IN | RESPIRATION RATE: 14 BRPM | WEIGHT: 120 LBS | HEART RATE: 85 BPM | OXYGEN SATURATION: 100 % | BODY MASS INDEX: 22.08 KG/M2 | TEMPERATURE: 98.2 F | SYSTOLIC BLOOD PRESSURE: 105 MMHG | DIASTOLIC BLOOD PRESSURE: 58 MMHG

## 2024-11-15 DIAGNOSIS — F17.201 TOBACCO ABUSE, IN REMISSION: ICD-10-CM

## 2024-11-15 DIAGNOSIS — R06.2 WHEEZING: ICD-10-CM

## 2024-11-15 DIAGNOSIS — R05.3 CHRONIC COUGH: ICD-10-CM

## 2024-11-15 DIAGNOSIS — J43.8 OTHER EMPHYSEMA: ICD-10-CM

## 2024-11-15 DIAGNOSIS — R06.01 ORTHOPNEA: ICD-10-CM

## 2024-11-15 DIAGNOSIS — J44.89 ASTHMA-COPD OVERLAP SYNDROME: Primary | ICD-10-CM

## 2024-11-15 DIAGNOSIS — R06.00 DYSPNEA, UNSPECIFIED TYPE: ICD-10-CM

## 2024-11-15 PROCEDURE — 1159F MED LIST DOCD IN RCRD: CPT

## 2024-11-15 PROCEDURE — 1160F RVW MEDS BY RX/DR IN RCRD: CPT

## 2024-11-15 PROCEDURE — 99214 OFFICE O/P EST MOD 30 MIN: CPT

## 2024-11-15 RX ORDER — CAMPHOR/EUCALYPT/MENTH/BENZOIN
LIQUID (ML) MISCELLANEOUS
COMMUNITY
Start: 2024-10-30

## 2024-11-15 RX ORDER — LORATADINE/PSEUDOEPHEDRINE 10MG-240MG
1 TABLET, EXTENDED RELEASE 24 HR ORAL DAILY
COMMUNITY
Start: 2024-10-30

## 2024-11-15 RX ORDER — DEXTROMETHORPHAN HYDROBROMIDE, GUAIFENESIN 10; 100 MG/5ML; MG/5ML
LIQUID ORAL
COMMUNITY
Start: 2024-10-30

## 2024-11-15 RX ORDER — BUDESONIDE AND FORMOTEROL FUMARATE DIHYDRATE 160; 4.5 UG/1; UG/1
AEROSOL RESPIRATORY (INHALATION) EVERY 12 HOURS SCHEDULED
COMMUNITY
End: 2024-11-16

## 2024-11-15 RX ORDER — FLUTICASONE PROPIONATE 50 MCG
SPRAY, SUSPENSION (ML) NASAL
COMMUNITY
Start: 2024-10-30

## 2024-11-22 ENCOUNTER — HOSPITAL ENCOUNTER (OUTPATIENT)
Dept: GENERAL RADIOLOGY | Facility: HOSPITAL | Age: 39
Discharge: HOME | End: 2024-11-22
Payer: COMMERCIAL

## 2024-11-22 DIAGNOSIS — R06.01 ORTHOPNEA: ICD-10-CM

## 2024-11-22 DIAGNOSIS — R05.3 CHRONIC COUGH: ICD-10-CM

## 2024-11-22 DIAGNOSIS — R06.00 DYSPNEA, UNSPECIFIED TYPE: ICD-10-CM

## 2024-11-22 PROCEDURE — 71046 X-RAY EXAM CHEST 2 VIEWS: CPT

## 2024-12-13 NOTE — PROGRESS NOTES
Primary Care Provider  Rachelle Ibrahim APRN   Referring Provider  No ref. provider found      Patient Complaint  Emphysema, Asthma, Follow-up (1 MONTH), Shortness of Breath, and Congestion      Subjective          Jillian Segura presents to [facility] PULMONARY & CRITICAL CARE MEDICINE      History of Presenting Illness  Jillian Segura is a 39 y.o. female patient of Dr. Rollins with asthma/COPD overlap, lung nodule, chronic dyspnea, and tobacco use in remission, here for 1 month follow up.    Patient presents today for follow up of acute symptoms.  She was last seen 1 month ago for increased shortness of breath, productive cough, chest tightness, and leg swelling.  Patient's symptoms were present despite multiple rounds of antibiotics and steroids.  Normally, patient has exacerbations during the changing of seasons that respond well to antibiotics/steroids.  Today, patient reports that she is feeling a little better than she was a month ago.  She has been trying to take it easy, not doing much.  Patient denies any fevers or chills, no ER visits or hospitalizations for her breathing since she was last seen.  At baseline, patient has mild exertional dyspnea and a chronic cough.  She will also wheeze intermittently.  Unfortunately patient has tried and failed several different maintenance inhalers due to them making her shaky and giving her migraines.  She intermittently uses Breztri which does help with her breathing but still has issues with side effects.  Patient has a Ventolin inhaler and Xopenex nebulizer treatments to use as needed.  She takes Daliresp 500 mcg daily, which she reports helps her the most.  She is a former cigarette smoker, quit 3 years ago, 23 pack years.  She currently vapes and has noticed improvement in her breathing since switching from cigarettes.  Patient denies any hemoptysis, swollen lymph nodes, weight loss, or night sweats.  Patient is able to perform ADLs with minor  modifications due to dyspnea.  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  I have personally reviewed the review of systems, past family, social, medical and surgical histories; and agree with their findings.      Review of Systems    Review of Systems   Constitutional:  Negative for activity change, chills, fatigue, fever, unexpected weight gain and unexpected weight loss.   HENT:  Positive for congestion. Negative for ear discharge, ear pain, mouth sores, postnasal drip, rhinorrhea, sinus pressure, sore throat, swollen glands and trouble swallowing.    Eyes:  Negative for blurred vision, pain, discharge, itching and visual disturbance.   Respiratory:  Positive for cough, shortness of breath (worse than baseline) and wheezing (intermittent). Negative for apnea, chest tightness and stridor.    Cardiovascular:  Positive for leg swelling. Negative for chest pain and palpitations.   Gastrointestinal:  Negative for abdominal distention, abdominal pain, constipation, diarrhea, nausea, vomiting, GERD and indigestion.   Musculoskeletal:  Negative for arthralgias, joint swelling and myalgias.   Skin:  Negative for color change.   Neurological:  Negative for dizziness, weakness, light-headedness and headache.      Sleep: Negative for Excessive daytime sleepiness  Negative for morning headaches  Negative for Snoring      Family History   Problem Relation Age of Onset    Diabetes Mother     Hypertension Mother     COPD Mother     Cancer Mother         Social History     Socioeconomic History    Marital status: Single   Tobacco Use    Smoking status: Former     Current packs/day: 0.00     Average packs/day: 1 pack/day for 23.0 years (23.0 ttl pk-yrs)     Types: Cigarettes     Start date: 1998     Quit date: 2021     Years since quitting: 3.9    Smokeless tobacco: Never   Vaping Use    Vaping status: Every Day    Substances: Nicotine    Devices: Pre-filled or refillable cartridge   Substance  and Sexual Activity    Alcohol use: Never    Drug use: Never    Sexual activity: Yes     Partners: Male     Birth control/protection: Tubal ligation        Past Medical History:   Diagnosis Date    Arthritis     Asthma     COPD (chronic obstructive pulmonary disease)     Lung nodules 07/08/2024    Other emphysema 07/08/2024        Immunization History   Administered Date(s) Administered    Fluzone (or Fluarix & Flulaval for VFC) >6mos 10/05/2021    Hepatitis B Adolescent High Risk Infant 05/05/1999    Td (TDVAX) 05/05/1999       Allergies   Allergen Reactions    Sulfamethoxazole-Trimethoprim GI Intolerance    Zithromax [Azithromycin] GI Intolerance     Stomach cramps/contractions    Egg-Derived Products GI Intolerance    Sulfa Antibiotics GI Intolerance          Current Outpatient Medications:     acyclovir (ZOVIRAX) 800 MG tablet, TAKE ONE TABLET BY MOUTH 5 TIMES A DAY FOR 7 DAYS, Disp: , Rfl:     albuterol sulfate  (90 Base) MCG/ACT inhaler, INHALE 1 PUFF BY MOUTH EVERY 4 HOURS AS NEEDED FOR SHORTNESS OF BREATH, Disp: , Rfl:     Budeson-Glycopyrrol-Formoterol (Breztri Aerosphere) 160-9-4.8 MCG/ACT aerosol inhaler, Inhale 2 puffs Every 12 (Twelve) Hours. Barely uses due to giving her the shakes, Disp: 1 each, Rfl: 5    busPIRone (BUSPAR) 5 MG tablet, , Disp: , Rfl:     Caplyta 42 MG capsule, Take 1 capsule by mouth Daily., Disp: , Rfl:     clobetasol (TEMOVATE) 0.05 % ointment, apply A thin layer TO THE AFFECTED AREA topically TWICE DAILY, Disp: , Rfl:     levalbuterol (XOPENEX) 0.63 MG/3ML nebulizer solution, Take 1 ampule by nebulization Every 6 (Six) Hours As Needed for Wheezing or Shortness of Air., Disp: 120 mL, Rfl: 11    prazosin (MINIPRESS) 1 MG capsule, Take 1 capsule by mouth Every Night., Disp: , Rfl:     roflumilast (Daliresp) 500 MCG tablet tablet, Take 1 tablet by mouth Daily., Disp: 30 tablet, Rfl: 5    SSD 1 % cream, APPLY A 1/16th INCH (1.5mm) thick layer TO entire BURN AREA TWICE DAILY,  "Disp: , Rfl:     Ubrelvy 100 MG tablet, Take 1 tablet by mouth at onset of migraine, may repeat in 2 hours if no relief, max of 2 per day, Disp: , Rfl:      Objective     Vital Signs:   BP 99/45 (BP Location: Right arm, Patient Position: Sitting, Cuff Size: Adult)   Pulse 74   Temp 98.2 °F (36.8 °C) (Tympanic)   Resp 18   Ht 157.5 cm (62\")   Wt 54.8 kg (120 lb 12.8 oz)   SpO2 99% Comment: room air  BMI 22.09 kg/m²     Physical Exam  Constitutional:       General: She is not in acute distress.     Appearance: Normal appearance. She is normal weight. She is not ill-appearing.   HENT:      Right Ear: Tympanic membrane and ear canal normal.      Left Ear: Tympanic membrane and ear canal normal.      Nose: Nose normal.      Mouth/Throat:      Mouth: Mucous membranes are moist.      Pharynx: Oropharynx is clear.   Eyes:      Extraocular Movements: Extraocular movements intact.      Conjunctiva/sclera: Conjunctivae normal.      Pupils: Pupils are equal, round, and reactive to light.   Cardiovascular:      Rate and Rhythm: Normal rate and regular rhythm.      Pulses: Normal pulses.      Heart sounds: Normal heart sounds.   Pulmonary:      Effort: Pulmonary effort is normal. No respiratory distress.      Breath sounds: Normal breath sounds. No stridor. No wheezing, rhonchi or rales.   Abdominal:      General: Bowel sounds are normal.      Palpations: Abdomen is soft.   Musculoskeletal:         General: No swelling. Normal range of motion.      Cervical back: Normal range of motion and neck supple.      Right lower leg: No edema.      Left lower leg: No edema.   Skin:     General: Skin is warm and dry.   Neurological:      General: No focal deficit present.      Mental Status: She is alert and oriented to person, place, and time.      Motor: No weakness.   Psychiatric:         Mood and Affect: Mood normal.         Behavior: Behavior normal.        Result Review :   I have personally reviewed patient's labs and images.  " I also reviewed my last progress note 11/15/2024.       Diagnoses and all orders for this visit:    1. Asthma-COPD overlap syndrome (Primary)    2. Dyspnea, unspecified type    3. Orthopnea    4. Other emphysema    5. Chronic cough    6. Wheezing    7. Tobacco abuse, in remission      Impression and Plan    -Alpha-1 antitrypsin genotype MM, 134 mg/dL  -PFTs 8/8/2024 showed mild obstructive defect, FEV1 72% of predicted.  No significant response to bronchodilator.  Air trapping present, normal DLCO.  Previous PFTs 2019 showed a significant response to bronchodilator.  -CT chest 8/13/2024 showed a stable 2 mm right upper lobe nodule.  Lungs clear.  No new or suspicious pulmonary nodules.  -Discussed possible biologic therapy for better asthma control.  Methacholine challenge, infectious workup, CBC with differential, IgE, and allergy panel ordered last visit to determine whether patient meets criteria.  Labs pending, scheduling phone number provided to patient for scheduling methacholine challenge.  -Continue using Breztri twice daily as needed, reminded patient to rinse mouth after each use.  She only uses Breztri intermittently due to side effects of migraine and shakiness.  -Continue using Ventolin inhaler and Xopenex nebulizer treatments as needed  -Continue taking Daliresp 500 mcg daily  -Follow up in 2 months after testing completed    Smoking status: Reviewed  Vaccination status: Patient declines vaccines.  Patient is advised to continue to follow CDC recommendations such as social distancing wearing a mask and washing hands for at least 20 seconds.  Medications personally reviewed    Follow Up   No follow-ups on file.  Patient was given instructions and counseling regarding her condition or for health maintenance advice. Please see specific information pulled into the AVS if appropriate.

## 2024-12-16 ENCOUNTER — LAB (OUTPATIENT)
Dept: LAB | Facility: HOSPITAL | Age: 39
End: 2024-12-16
Payer: COMMERCIAL

## 2024-12-16 ENCOUNTER — OFFICE VISIT (OUTPATIENT)
Dept: PULMONOLOGY | Facility: CLINIC | Age: 39
End: 2024-12-16
Payer: COMMERCIAL

## 2024-12-16 VITALS
BODY MASS INDEX: 22.23 KG/M2 | WEIGHT: 120.8 LBS | HEIGHT: 62 IN | OXYGEN SATURATION: 99 % | SYSTOLIC BLOOD PRESSURE: 99 MMHG | HEART RATE: 74 BPM | TEMPERATURE: 98.2 F | DIASTOLIC BLOOD PRESSURE: 45 MMHG | RESPIRATION RATE: 18 BRPM

## 2024-12-16 DIAGNOSIS — J44.89 ASTHMA-COPD OVERLAP SYNDROME: ICD-10-CM

## 2024-12-16 DIAGNOSIS — J44.89 ASTHMA-COPD OVERLAP SYNDROME: Primary | ICD-10-CM

## 2024-12-16 DIAGNOSIS — F17.201 TOBACCO ABUSE, IN REMISSION: ICD-10-CM

## 2024-12-16 DIAGNOSIS — J43.8 OTHER EMPHYSEMA: ICD-10-CM

## 2024-12-16 DIAGNOSIS — R05.3 CHRONIC COUGH: ICD-10-CM

## 2024-12-16 DIAGNOSIS — R06.00 DYSPNEA, UNSPECIFIED TYPE: ICD-10-CM

## 2024-12-16 DIAGNOSIS — R06.2 WHEEZING: ICD-10-CM

## 2024-12-16 DIAGNOSIS — R06.01 ORTHOPNEA: ICD-10-CM

## 2024-12-16 LAB
BASOPHILS # BLD AUTO: 0.04 10*3/MM3 (ref 0–0.2)
BASOPHILS NFR BLD AUTO: 0.6 % (ref 0–1.5)
DEPRECATED RDW RBC AUTO: 42.8 FL (ref 37–54)
EOSINOPHIL # BLD AUTO: 0.08 10*3/MM3 (ref 0–0.4)
EOSINOPHIL NFR BLD AUTO: 1.2 % (ref 0.3–6.2)
ERYTHROCYTE [DISTWIDTH] IN BLOOD BY AUTOMATED COUNT: 12.5 % (ref 12.3–15.4)
HCT VFR BLD AUTO: 37.8 % (ref 34–46.6)
HGB BLD-MCNC: 12.5 G/DL (ref 12–15.9)
IMM GRANULOCYTES # BLD AUTO: 0.02 10*3/MM3 (ref 0–0.05)
IMM GRANULOCYTES NFR BLD AUTO: 0.3 % (ref 0–0.5)
LYMPHOCYTES # BLD AUTO: 1.3 10*3/MM3 (ref 0.7–3.1)
LYMPHOCYTES NFR BLD AUTO: 18.8 % (ref 19.6–45.3)
MCH RBC QN AUTO: 30.9 PG (ref 26.6–33)
MCHC RBC AUTO-ENTMCNC: 33.1 G/DL (ref 31.5–35.7)
MCV RBC AUTO: 93.3 FL (ref 79–97)
MONOCYTES # BLD AUTO: 0.42 10*3/MM3 (ref 0.1–0.9)
MONOCYTES NFR BLD AUTO: 6.1 % (ref 5–12)
NEUTROPHILS NFR BLD AUTO: 5.05 10*3/MM3 (ref 1.7–7)
NEUTROPHILS NFR BLD AUTO: 73 % (ref 42.7–76)
NRBC BLD AUTO-RTO: 0 /100 WBC (ref 0–0.2)
PLATELET # BLD AUTO: 327 10*3/MM3 (ref 140–450)
PMV BLD AUTO: 10.7 FL (ref 6–12)
RBC # BLD AUTO: 4.05 10*6/MM3 (ref 3.77–5.28)
WBC NRBC COR # BLD AUTO: 6.91 10*3/MM3 (ref 3.4–10.8)

## 2024-12-16 PROCEDURE — 86003 ALLG SPEC IGE CRUDE XTRC EA: CPT

## 2024-12-16 PROCEDURE — 82104 ALPHA-1-ANTITRYPSIN PHENO: CPT

## 2024-12-16 PROCEDURE — 1160F RVW MEDS BY RX/DR IN RCRD: CPT

## 2024-12-16 PROCEDURE — 87305 ASPERGILLUS AG IA: CPT

## 2024-12-16 PROCEDURE — 87449 NOS EACH ORGANISM AG IA: CPT

## 2024-12-16 PROCEDURE — 86480 TB TEST CELL IMMUN MEASURE: CPT

## 2024-12-16 PROCEDURE — 99214 OFFICE O/P EST MOD 30 MIN: CPT

## 2024-12-16 PROCEDURE — 85025 COMPLETE CBC W/AUTO DIFF WBC: CPT

## 2024-12-16 PROCEDURE — 82103 ALPHA-1-ANTITRYPSIN TOTAL: CPT

## 2024-12-16 PROCEDURE — 1159F MED LIST DOCD IN RCRD: CPT

## 2024-12-16 PROCEDURE — 36415 COLL VENOUS BLD VENIPUNCTURE: CPT

## 2024-12-16 PROCEDURE — 82785 ASSAY OF IGE: CPT

## 2024-12-16 RX ORDER — ACYCLOVIR 800 MG/1
TABLET ORAL
COMMUNITY
Start: 2024-11-19

## 2024-12-16 RX ORDER — CLOBETASOL PROPIONATE 0.5 MG/G
OINTMENT TOPICAL
COMMUNITY
Start: 2024-11-19

## 2024-12-16 RX ORDER — PRAZOSIN HYDROCHLORIDE 1 MG/1
1 CAPSULE ORAL NIGHTLY
COMMUNITY
Start: 2024-12-10

## 2024-12-16 RX ORDER — BUSPIRONE HYDROCHLORIDE 5 MG/1
TABLET ORAL
COMMUNITY
Start: 2024-12-13

## 2024-12-16 RX ORDER — SILVER SULFADIAZINE 10 G/1000G
CREAM TOPICAL
COMMUNITY
Start: 2024-12-10

## 2024-12-17 ENCOUNTER — TELEPHONE (OUTPATIENT)
Dept: PULMONOLOGY | Facility: CLINIC | Age: 39
End: 2024-12-17
Payer: COMMERCIAL

## 2024-12-17 NOTE — TELEPHONE ENCOUNTER
Lab processing called and stated they can not run histoplasma antigen ser. Spoke with DOMINIC Triana in regards to this she stated this test can be canceled. Notified the lab.

## 2024-12-18 LAB
DPYD GENE MUT ANL BLD/T: NEGATIVE
FUNGITELL VALUE: <31.25 PG/ML
GAMMA INTERFERON BACKGROUND BLD IA-ACNC: 0 IU/ML
IMP & REVIEW OF LAB RESULTS: NORMAL
Lab: NORMAL
Lab: NORMAL
M TB IFN-G BLD-IMP: NEGATIVE
M TB IFN-G CD4+ BCKGRND COR BLD-ACNC: 0 IU/ML
M TB IFN-G CD4+CD8+ BCKGRND COR BLD-ACNC: 0 IU/ML
MITOGEN IGNF BCKGRD COR BLD-ACNC: 7.37 IU/ML
PATHOLOGIST NAME: NORMAL
QUANTIFERON INCUBATION: NORMAL
REFERENCE VALUE: NORMAL
SERVICE CMNT-IMP: NORMAL

## 2024-12-19 LAB
A ALTERNATA IGE QN: <0.1 KU/L
A FUMIGATUS IGE QN: <0.1 KU/L
AMER ROACH IGE QN: <0.1 KU/L
BAHIA GRASS IGE QN: <0.1 KU/L
BERMUDA GRASS IGE QN: <0.1 KU/L
BOXELDER IGE QN: <0.1 KU/L
C HERBARUM IGE QN: <0.1 KU/L
CAT DANDER IGE QN: <0.1 KU/L
CMN PIGWEED IGE QN: <0.1 KU/L
COMMON RAGWEED IGE QN: <0.1 KU/L
CONV CLASS DESCRIPTION: NORMAL
D FARINAE IGE QN: <0.1 KU/L
D PTERONYSS IGE QN: <0.1 KU/L
DOG DANDER IGE QN: <0.1 KU/L
ENGL PLANTAIN IGE QN: <0.1 KU/L
GALACTOMANNAN AG SPEC IA-ACNC: 0.03 INDEX (ref 0–0.49)
HAZELNUT POLN IGE QN: <0.1 KU/L
JOHNSON GRASS IGE QN: <0.1 KU/L
KENT BLUE GRASS IGE QN: <0.1 KU/L
LONDON PLANE IGE QN: <0.1 KU/L
M RACEMOSUS IGE QN: <0.1 KU/L
MT JUNIPER IGE QN: <0.1 KU/L
MUGWORT IGE QN: <0.1 KU/L
NETTLE IGE QN: <0.1 KU/L
P NOTATUM IGE QN: <0.1 KU/L
S BOTRYOSUM IGE QN: <0.1 KU/L
SHEEP SORREL IGE QN: <0.1 KU/L
SWEET GUM IGE QN: <0.1 KU/L
WHITE ELM IGE QN: <0.1 KU/L
WHITE HICKORY IGE QN: <0.1 KU/L
WHITE MULBERRY IGE QN: <0.1 KU/L
WHITE OAK IGE QN: <0.1 KU/L

## 2024-12-20 LAB — IGE SERPL-ACNC: 12 IU/ML (ref 6–495)

## 2024-12-29 LAB
A1AT PHENOTYP SERPL IFE: NORMAL
A1AT SERPL-MCNC: 173 MG/DL (ref 100–188)

## 2025-01-27 NOTE — PROGRESS NOTES
Primary Care Provider  Rachelle Ibrahim APRN   Referring Provider  No ref. provider found      Patient Complaint  Emphysema, Asthma, Lung Nodule, Follow-up, Shortness of Breath, and Congestion      Subjective          Jillian Segura presents to [facility]UL[facility] & CRITICAL CARE MEDICINE      History of Presenting Illness  Jillian Segura is a 39 y.o. female patient of Dr. Rollins with asthma/COPD overlap, lung nodule, chronic dyspnea, and tobacco use in remission, here for 2 month follow up.    Patient states she is doing okay since her last visit, here today to go over lab results.  We discussed that her infectious workup, allergy panel, CBC, and IgE were normal.  Patient has been experiencing increased shortness of breath, productive cough, chest tightness, and leg swelling for the past few months.  Normally, patient has exacerbations during the changing of seasons that respond well to antibiotics/steroids.  Today, patient reports that she is feeling a little better than she did at her last visit.  She has been trying to take it easy, not doing much.  Patient denies any fevers or chills, no ER visits or hospitalizations for her breathing since she was last seen.  At baseline, patient has mild exertional dyspnea and a chronic cough.  She will also wheeze intermittently.  Unfortunately patient has tried and failed several different maintenance inhalers due to them making her shaky and giving her migraines.  She intermittently uses Breztri which does help with her breathing but still has issues with side effects.  Patient has a Ventolin inhaler and Xopenex nebulizer treatments to use as needed.  She takes Daliresp 500 mcg daily, which she reports helps her the most.  She is a former cigarette smoker, quit 3 years ago, 23 pack years.  She currently vapes and has noticed improvement in her breathing since switching from cigarettes.  Patient denies any hemoptysis, swollen lymph nodes, weight loss, or  night sweats.  Patient is able to perform ADLs with minor modifications due to dyspnea.  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  I have personally reviewed the review of systems, past family, social, medical and surgical histories; and agree with their findings.      Review of Systems    Review of Systems   Constitutional:  Negative for activity change, chills, fatigue, fever, unexpected weight gain and unexpected weight loss.   HENT:  Positive for congestion. Negative for ear discharge, ear pain, mouth sores, postnasal drip, rhinorrhea, sinus pressure, sore throat, swollen glands and trouble swallowing.    Eyes:  Negative for blurred vision, pain, discharge, itching and visual disturbance.   Respiratory:  Positive for cough, shortness of breath (worse than baseline) and wheezing (intermittent). Negative for apnea, chest tightness and stridor.    Cardiovascular:  Negative for chest pain, palpitations and leg swelling.   Gastrointestinal:  Negative for abdominal distention, abdominal pain, constipation, diarrhea, nausea, vomiting, GERD and indigestion.   Musculoskeletal:  Negative for arthralgias, joint swelling and myalgias.   Skin:  Negative for color change.   Neurological:  Negative for dizziness, weakness, light-headedness and headache.      Sleep: Negative for Excessive daytime sleepiness  Negative for morning headaches  Negative for Snoring      Family History   Problem Relation Age of Onset    Diabetes Mother     Hypertension Mother     COPD Mother     Cancer Mother         Social History     Socioeconomic History    Marital status: Single   Tobacco Use    Smoking status: Former     Current packs/day: 0.00     Average packs/day: 1 pack/day for 23.0 years (23.0 ttl pk-yrs)     Types: Cigarettes     Start date:      Quit date:      Years since quittin.0    Smokeless tobacco: Never   Vaping Use    Vaping status: Every Day    Substances: Nicotine    Devices:  Pre-filled or refillable cartridge   Substance and Sexual Activity    Alcohol use: Never    Drug use: Never    Sexual activity: Yes     Partners: Male     Birth control/protection: Tubal ligation        Past Medical History:   Diagnosis Date    Arthritis     Asthma     COPD (chronic obstructive pulmonary disease)     Lung nodules 07/08/2024    Other emphysema 07/08/2024        Immunization History   Administered Date(s) Administered    Fluzone (or Fluarix & Flulaval for VFC) >6mos 10/05/2021    Hepatitis B Adolescent High Risk Infant 05/05/1999    Td (TDVAX) 05/05/1999       Allergies   Allergen Reactions    Sulfamethoxazole-Trimethoprim GI Intolerance    Zithromax [Azithromycin] GI Intolerance     Stomach cramps/contractions    Egg-Derived Products GI Intolerance    Sulfa Antibiotics GI Intolerance          Current Outpatient Medications:     albuterol sulfate  (90 Base) MCG/ACT inhaler, INHALE 1 PUFF BY MOUTH EVERY 4 HOURS AS NEEDED FOR SHORTNESS OF BREATH, Disp: , Rfl:     Budeson-Glycopyrrol-Formoterol (Breztri Aerosphere) 160-9-4.8 MCG/ACT aerosol inhaler, Inhale 2 puffs Every 12 (Twelve) Hours. Barely uses due to giving her the shakes, Disp: 1 each, Rfl: 5    busPIRone (BUSPAR) 5 MG tablet, , Disp: , Rfl:     Caplyta 42 MG capsule, Take 1 capsule by mouth Daily., Disp: , Rfl:     levalbuterol (XOPENEX) 0.63 MG/3ML nebulizer solution, Take 1 ampule by nebulization Every 6 (Six) Hours As Needed for Wheezing or Shortness of Air., Disp: 120 mL, Rfl: 11    roflumilast (Daliresp) 500 MCG tablet tablet, Take 1 tablet by mouth Daily., Disp: 30 tablet, Rfl: 5    Ubrelvy 100 MG tablet, Take 1 tablet by mouth at onset of migraine, may repeat in 2 hours if no relief, max of 2 per day, Disp: , Rfl:     acyclovir (ZOVIRAX) 800 MG tablet, TAKE ONE TABLET BY MOUTH 5 TIMES A DAY FOR 7 DAYS (Patient not taking: Reported on 1/28/2025), Disp: , Rfl:     clobetasol (TEMOVATE) 0.05 % ointment, apply A thin layer TO THE  "AFFECTED AREA topically TWICE DAILY (Patient not taking: Reported on 1/28/2025), Disp: , Rfl:     prazosin (MINIPRESS) 1 MG capsule, Take 1 capsule by mouth Every Night. (Patient not taking: Reported on 1/28/2025), Disp: , Rfl:     SSD 1 % cream, APPLY A 1/16th INCH (1.5mm) thick layer TO entire BURN AREA TWICE DAILY (Patient not taking: Reported on 1/28/2025), Disp: , Rfl:      Objective     Vital Signs:   /74 (BP Location: Right arm, Patient Position: Sitting, Cuff Size: Adult)   Pulse 76   Temp 97.9 °F (36.6 °C) (Oral)   Resp 16   Ht 157.5 cm (62\")   Wt 54.4 kg (120 lb)   SpO2 98% Comment: room air  BMI 21.95 kg/m²     Physical Exam  Constitutional:       General: She is not in acute distress.     Appearance: Normal appearance. She is normal weight. She is not ill-appearing.   HENT:      Right Ear: Tympanic membrane and ear canal normal.      Left Ear: Tympanic membrane and ear canal normal.      Nose: Nose normal.      Mouth/Throat:      Mouth: Mucous membranes are moist.      Pharynx: Oropharynx is clear.   Eyes:      Extraocular Movements: Extraocular movements intact.      Conjunctiva/sclera: Conjunctivae normal.      Pupils: Pupils are equal, round, and reactive to light.   Cardiovascular:      Rate and Rhythm: Normal rate and regular rhythm.      Pulses: Normal pulses.      Heart sounds: Normal heart sounds.   Pulmonary:      Effort: Pulmonary effort is normal. No respiratory distress.      Breath sounds: Normal breath sounds. No stridor. No wheezing, rhonchi or rales.   Abdominal:      General: Bowel sounds are normal.      Palpations: Abdomen is soft.   Musculoskeletal:         General: No swelling. Normal range of motion.      Cervical back: Normal range of motion and neck supple.      Right lower leg: No edema.      Left lower leg: No edema.   Skin:     General: Skin is warm and dry.   Neurological:      General: No focal deficit present.      Mental Status: She is alert and oriented to " person, place, and time.      Motor: No weakness.   Psychiatric:         Mood and Affect: Mood normal.         Behavior: Behavior normal.      Result Review :   I have personally reviewed patient's labs and images.  I also reviewed my last progress note 12/16/2024.       Diagnoses and all orders for this visit:    1. Asthma-COPD overlap syndrome (Primary)  -     Bronchial Challenge With Methacholine; Future    2. Dyspnea, unspecified type  -     Bronchial Challenge With Methacholine; Future    3. Orthopnea    4. Other emphysema    5. Chronic cough    6. Wheezing    7. Tobacco abuse, in remission      Impression and Plan    -Alpha-1 antitrypsin genotype MM, 173 mg/dL  -PFTs 8/8/2024 showed mild obstructive defect, FEV1 72% of predicted.  No significant response to bronchodilator.  Air trapping present, normal DLCO.  Previous PFTs 2019 showed a significant response to bronchodilator.  -CT chest 8/13/2024 showed a stable 2 mm right upper lobe nodule.  Lungs clear.  No new or suspicious pulmonary nodules.  -Discussed possible biologic therapy for better asthma control.  Infectious workup, CBC with differential, IgE, and allergy panel results normal.  -Methacholine challenge reordered today  -Continue using Breztri twice daily as needed, reminded patient to rinse mouth after each use.  She only uses Breztri intermittently due to side effects of migraine and shakiness.  -Continue using Ventolin inhaler and Xopenex nebulizer treatments as needed  -Continue taking Daliresp 500 mcg daily  -Follow up after methacholine challenge completed    Smoking status: Reviewed  Vaccination status: Patient declines vaccines.  Patient is advised to continue to follow CDC recommendations such as social distancing wearing a mask and washing hands for at least 20 seconds.  Medications personally reviewed    Follow Up   No follow-ups on file.  Patient was given instructions and counseling regarding her condition or for health maintenance  advice. Please see specific information pulled into the AVS if appropriate.

## 2025-01-28 ENCOUNTER — OFFICE VISIT (OUTPATIENT)
Dept: PULMONOLOGY | Facility: CLINIC | Age: 40
End: 2025-01-28
Payer: COMMERCIAL

## 2025-01-28 VITALS
OXYGEN SATURATION: 98 % | DIASTOLIC BLOOD PRESSURE: 74 MMHG | WEIGHT: 120 LBS | RESPIRATION RATE: 16 BRPM | SYSTOLIC BLOOD PRESSURE: 103 MMHG | BODY MASS INDEX: 22.08 KG/M2 | TEMPERATURE: 97.9 F | HEART RATE: 76 BPM | HEIGHT: 62 IN

## 2025-01-28 DIAGNOSIS — J44.89 ASTHMA-COPD OVERLAP SYNDROME: Primary | ICD-10-CM

## 2025-01-28 DIAGNOSIS — R06.00 DYSPNEA, UNSPECIFIED TYPE: ICD-10-CM

## 2025-01-28 DIAGNOSIS — J43.8 OTHER EMPHYSEMA: ICD-10-CM

## 2025-01-28 DIAGNOSIS — F17.201 TOBACCO ABUSE, IN REMISSION: ICD-10-CM

## 2025-01-28 DIAGNOSIS — R06.2 WHEEZING: ICD-10-CM

## 2025-01-28 DIAGNOSIS — R05.3 CHRONIC COUGH: ICD-10-CM

## 2025-01-28 DIAGNOSIS — R06.01 ORTHOPNEA: ICD-10-CM

## 2025-01-28 PROCEDURE — 1160F RVW MEDS BY RX/DR IN RCRD: CPT

## 2025-01-28 PROCEDURE — 99214 OFFICE O/P EST MOD 30 MIN: CPT

## 2025-01-28 PROCEDURE — 1159F MED LIST DOCD IN RCRD: CPT

## 2025-03-18 ENCOUNTER — TRANSCRIBE ORDERS (OUTPATIENT)
Dept: ADMINISTRATIVE | Facility: HOSPITAL | Age: 40
End: 2025-03-18
Payer: COMMERCIAL

## 2025-03-18 DIAGNOSIS — Z12.31 OTHER SCREENING MAMMOGRAM: Primary | ICD-10-CM

## 2025-03-20 RX ORDER — EPINEPHRINE 0.3 MG/.3ML
0.3 INJECTION SUBCUTANEOUS ONCE AS NEEDED
Status: DISCONTINUED | OUTPATIENT
Start: 2025-03-26 | End: 2025-03-27 | Stop reason: HOSPADM

## 2025-03-20 RX ORDER — METHACHOLINE CHLORIDE 48 MG/3 ML
3 VIAL, NEBULIZER (ML) INHALATION
Status: ACTIVE | OUTPATIENT
Start: 2025-03-26 | End: 2025-03-26

## 2025-03-20 RX ORDER — ALBUTEROL SULFATE 0.83 MG/ML
2.5 SOLUTION RESPIRATORY (INHALATION) ONCE
Status: DISCONTINUED | OUTPATIENT
Start: 2025-03-26 | End: 2025-03-27 | Stop reason: HOSPADM

## 2025-03-20 RX ORDER — SODIUM CHLORIDE FOR INHALATION 0.9 %
3 VIAL, NEBULIZER (ML) INHALATION ONCE AS NEEDED
Status: DISCONTINUED | OUTPATIENT
Start: 2025-03-26 | End: 2025-03-27 | Stop reason: HOSPADM

## 2025-03-20 RX ORDER — ALBUTEROL SULFATE 0.83 MG/ML
2.5 SOLUTION RESPIRATORY (INHALATION) ONCE AS NEEDED
Status: COMPLETED | OUTPATIENT
Start: 2025-03-26 | End: 2025-03-26

## 2025-03-20 RX ORDER — METHACHOLINE CHLORIDE 0 MG/3 ML
3 VIAL, NEBULIZER (ML) INHALATION
Status: COMPLETED | OUTPATIENT
Start: 2025-03-26 | End: 2025-03-26

## 2025-03-20 RX ORDER — METHACHOLINE CHLORIDE 12 MG/3 ML
3 VIAL, NEBULIZER (ML) INHALATION
Status: ACTIVE | OUTPATIENT
Start: 2025-03-26 | End: 2025-03-26

## 2025-03-20 RX ORDER — METHACHOLINE CHLORIDE 0.1875/3ML
3 VIAL, NEBULIZER (ML) INHALATION
Status: COMPLETED | OUTPATIENT
Start: 2025-03-26 | End: 2025-03-26

## 2025-03-20 RX ORDER — METHACHOLINE CHLORIDE 0.75/3ML
3 VIAL, NEBULIZER (ML) INHALATION
Status: COMPLETED | OUTPATIENT
Start: 2025-03-26 | End: 2025-03-26

## 2025-03-20 RX ORDER — METHACHOLINE CHLORIDE 3 MG/3 ML
3 VIAL, NEBULIZER (ML) INHALATION
Status: COMPLETED | OUTPATIENT
Start: 2025-03-26 | End: 2025-03-26

## 2025-03-26 ENCOUNTER — HOSPITAL ENCOUNTER (OUTPATIENT)
Dept: RESPIRATORY THERAPY | Facility: HOSPITAL | Age: 40
Discharge: HOME | End: 2025-03-26
Payer: COMMERCIAL

## 2025-03-26 DIAGNOSIS — J44.89 ASTHMA-COPD OVERLAP SYNDROME: ICD-10-CM

## 2025-03-26 DIAGNOSIS — R06.00 DYSPNEA, UNSPECIFIED TYPE: ICD-10-CM

## 2025-03-26 PROCEDURE — 94070 EVALUATION OF WHEEZING: CPT

## 2025-03-26 RX ADMIN — Medication 0.19 MG: at 08:09

## 2025-03-26 RX ADMIN — Medication 3 ML: at 08:03

## 2025-03-26 RX ADMIN — Medication 3 MG: at 08:19

## 2025-03-26 RX ADMIN — Medication 0.75 MG: at 08:14

## 2025-03-26 RX ADMIN — ALBUTEROL SULFATE 2.5 MG: 2.5 SOLUTION RESPIRATORY (INHALATION) at 08:21

## 2025-04-15 ENCOUNTER — OFFICE VISIT (OUTPATIENT)
Dept: PULMONOLOGY | Facility: CLINIC | Age: 40
End: 2025-04-15
Payer: COMMERCIAL

## 2025-04-15 VITALS
RESPIRATION RATE: 16 BRPM | HEIGHT: 62 IN | BODY MASS INDEX: 22.08 KG/M2 | WEIGHT: 120 LBS | DIASTOLIC BLOOD PRESSURE: 73 MMHG | TEMPERATURE: 98.2 F | HEART RATE: 71 BPM | SYSTOLIC BLOOD PRESSURE: 111 MMHG | OXYGEN SATURATION: 100 %

## 2025-04-15 DIAGNOSIS — Z87.891 FORMER TOBACCO USE: ICD-10-CM

## 2025-04-15 DIAGNOSIS — J45.20 MILD INTERMITTENT ASTHMA WITHOUT COMPLICATION: ICD-10-CM

## 2025-04-15 DIAGNOSIS — J43.8 OTHER EMPHYSEMA: Primary | ICD-10-CM

## 2025-04-15 DIAGNOSIS — Z72.0 VAPES NICOTINE CONTAINING SUBSTANCE: ICD-10-CM

## 2025-04-15 DIAGNOSIS — R91.8 LUNG NODULES: ICD-10-CM

## 2025-04-15 PROCEDURE — 1159F MED LIST DOCD IN RCRD: CPT | Performed by: STUDENT IN AN ORGANIZED HEALTH CARE EDUCATION/TRAINING PROGRAM

## 2025-04-15 PROCEDURE — 99214 OFFICE O/P EST MOD 30 MIN: CPT | Performed by: STUDENT IN AN ORGANIZED HEALTH CARE EDUCATION/TRAINING PROGRAM

## 2025-04-15 PROCEDURE — 1160F RVW MEDS BY RX/DR IN RCRD: CPT | Performed by: STUDENT IN AN ORGANIZED HEALTH CARE EDUCATION/TRAINING PROGRAM

## 2025-04-15 RX ORDER — FLUTICASONE PROPIONATE 50 MCG
SPRAY, SUSPENSION (ML) NASAL
COMMUNITY
Start: 2025-03-07

## 2025-04-15 RX ORDER — NITROFURANTOIN 25; 75 MG/1; MG/1
1 CAPSULE ORAL EVERY 12 HOURS SCHEDULED
COMMUNITY
Start: 2025-03-20

## 2025-04-15 RX ORDER — CAMPHOR/EUCALYPT/MENTH/BENZOIN
LIQUID (ML) MISCELLANEOUS
COMMUNITY
Start: 2025-03-10

## 2025-04-15 RX ORDER — FLUCONAZOLE 150 MG/1
TABLET ORAL
COMMUNITY
Start: 2025-03-27

## 2025-04-15 RX ORDER — ONDANSETRON 4 MG/1
TABLET, ORALLY DISINTEGRATING ORAL
COMMUNITY
Start: 2025-03-20

## 2025-04-15 RX ORDER — MONTELUKAST SODIUM 10 MG/1
10 TABLET ORAL NIGHTLY
Qty: 30 TABLET | Refills: 5 | Status: SHIPPED | OUTPATIENT
Start: 2025-04-15

## 2025-04-15 RX ORDER — PHENAZOPYRIDINE HYDROCHLORIDE 200 MG/1
TABLET, FILM COATED ORAL
COMMUNITY
Start: 2025-03-20

## 2025-04-15 NOTE — PROGRESS NOTES
Primary Care Provider  Rachelle Ibrahim APRN   Referring Provider  No ref. provider found      Patient Complaint  Follow-up, Asthma, and COPD      Subjective          Jillian Segura presents to [facility] PULMONARY & CRITICAL CARE MEDICINE      History of Presenting Illness  Jillian Segura is a 40 y.o. female with asthma/COPD overlap, nodules, tobacco use in remission here for follow-up    Patient is doing fair today  She is unable to tolerate Breztri or any of the beta agonist due to making her feel jittery and with a headache all day  She says the Daliresp sometimes helps her symptoms  She has chronic dyspnea on exertion and cough  She is a former smoker, quit   She does vape  I have personally reviewed the review of systems, past family, social, medical and surgical histories; and agree with their findings.    Review of Systems  Constitutional:  No fever. No chills. No weakness.  ENT:  No sore throat, URI symptoms.   Cardiovascular:  No chest pain. No palpitations. No lower extremity edema.  Respiratory:  No shortness of breath, cough, pleuritic chest pain. No hemoptysis. + dyspnea.   GI:  Normal appetite. No nausea, vomiting, diarrhea. No melena.  Musculoskeletal:  No arthralgias or myalgias.  Neurologic:  No weakness    Family History   Problem Relation Age of Onset    Diabetes Mother     Hypertension Mother     COPD Mother     Cancer Mother         Social History     Socioeconomic History    Marital status: Single   Tobacco Use    Smoking status: Former     Current packs/day: 0.00     Average packs/day: 1 pack/day for 23.0 years (23.0 ttl pk-yrs)     Types: Cigarettes     Start date:      Quit date:      Years since quittin.2    Smokeless tobacco: Never   Vaping Use    Vaping status: Every Day    Substances: Nicotine    Devices: Pre-filled or refillable cartridge   Substance and Sexual Activity    Alcohol use: Never    Drug use: Never    Sexual activity: Yes     Partners:  Male     Birth control/protection: Tubal ligation        Past Medical History:   Diagnosis Date    Arthritis     Asthma     COPD (chronic obstructive pulmonary disease)     Lung nodules 07/08/2024    Other emphysema 07/08/2024        Immunization History   Administered Date(s) Administered    Fluzone (or Fluarix & Flulaval for VFC) >6mos 10/05/2021    Hepatitis B Adolescent High Risk Infant 05/05/1999    Td (TDVAX) 05/05/1999       Allergies   Allergen Reactions    Sulfamethoxazole-Trimethoprim GI Intolerance    Zithromax [Azithromycin] GI Intolerance     Stomach cramps/contractions    Egg-Derived Products GI Intolerance    Sulfa Antibiotics GI Intolerance          Current Outpatient Medications:     fluconazole (DIFLUCAN) 150 MG tablet, TAKE ONE TABLET BY MOUTH AS A ONE TIME DOSE. REPEAT ONCE AFTER completing antibiotics, Disp: , Rfl:     fluticasone (FLONASE) 50 MCG/ACT nasal spray, instill 1-2 SPRAYS IN ech nostril ONCE DAILY AS NEEDED, Disp: , Rfl:     nitrofurantoin, macrocrystal-monohydrate, (MACROBID) 100 MG capsule, Take 1 capsule by mouth Every 12 (Twelve) Hours., Disp: , Rfl:     ondansetron ODT (ZOFRAN-ODT) 4 MG disintegrating tablet, place 1 tablet on the tongue and allow to dissolve THREE TIMES DAILY AS NEEDED FOR NAUSEA, Disp: , Rfl:     phenazopyridine (PYRIDIUM) 200 MG tablet, TAKE ONE TABLET BY MOUTH THREE TIMES DAILY AFTER MEALS FOR 2 DAYS, Disp: , Rfl:     Vicks Sinex 12 Hour Decongest 0.05 % nasal spray, administer 2 SPRAYS IN EACH nostril TWICE DAILY IN THE MORNING AND IN THE EVENING, Disp: , Rfl:     acyclovir (ZOVIRAX) 800 MG tablet, TAKE ONE TABLET BY MOUTH 5 TIMES A DAY FOR 7 DAYS (Patient not taking: Reported on 1/28/2025), Disp: , Rfl:     albuterol sulfate  (90 Base) MCG/ACT inhaler, INHALE 1 PUFF BY MOUTH EVERY 4 HOURS AS NEEDED FOR SHORTNESS OF BREATH, Disp: , Rfl:     Budeson-Glycopyrrol-Formoterol (Breztri Aerosphere) 160-9-4.8 MCG/ACT aerosol inhaler, Inhale 2 puffs Every 12  "(Twelve) Hours. Barely uses due to giving her the shakes, Disp: 1 each, Rfl: 5    busPIRone (BUSPAR) 5 MG tablet, , Disp: , Rfl:     Caplyta 42 MG capsule, Take 1 capsule by mouth Daily., Disp: , Rfl:     clobetasol (TEMOVATE) 0.05 % ointment, apply A thin layer TO THE AFFECTED AREA topically TWICE DAILY (Patient not taking: Reported on 1/28/2025), Disp: , Rfl:     levalbuterol (XOPENEX) 0.63 MG/3ML nebulizer solution, Take 1 ampule by nebulization Every 6 (Six) Hours As Needed for Wheezing or Shortness of Air., Disp: 120 mL, Rfl: 11    prazosin (MINIPRESS) 1 MG capsule, Take 1 capsule by mouth Every Night. (Patient not taking: Reported on 1/28/2025), Disp: , Rfl:     roflumilast (Daliresp) 500 MCG tablet tablet, Take 1 tablet by mouth Daily., Disp: 30 tablet, Rfl: 5    SSD 1 % cream, APPLY A 1/16th INCH (1.5mm) thick layer TO entire BURN AREA TWICE DAILY (Patient not taking: Reported on 1/28/2025), Disp: , Rfl:     Ubrelvy 100 MG tablet, Take 1 tablet by mouth at onset of migraine, may repeat in 2 hours if no relief, max of 2 per day, Disp: , Rfl:      Objective     Vital Signs:   Resp 16   Ht 157.5 cm (62\")   Wt 54.4 kg (120 lb)   BMI 21.95 kg/m²     Physical Exam  Vital Signs Reviewed   WDWN, Alert, NAD.    HEENT:  EOMI.  nares clear  Neck:  Supple, no JVD, no thyromegaly  Chest:  clear to auscultation bilaterally, no wheezes, crackles; no work of breathing noted  CV: RRR, no M/G/R, pulses 2+, equal.  Abd:  Soft, NT, ND, +BS  EXT:  no clubbing, no cyanosis, no edema  Neuro:  A&Ox3, CN grossly intact, no focal deficits.  Skin: No rashes or lesions noted       Result Review :   I have personally reviewed patient's labs and images.              Patient Active Problem List   Diagnosis    OAB (overactive bladder)    Stricture of female urethra    Other emphysema    Mild intermittent asthma without complication    Former tobacco use    Vapes nicotine containing substance    Lung nodules       Impression and " Plan    Asthma/COPD overlap  Chronic dyspnea  Chronic cough  Lung nodule  Tobacco use in remission    -Alpha-1 antitrypsin genotype MM, 173 mg/dL  -PFTs 8/8/2024 showed mild obstructive defect, FEV1 72% of predicted.  No significant response to bronchodilator.  Air trapping present, normal DLCO.  Previous PFTs 2019 showed a significant response to bronchodilator.  -CT chest 8/13/2024 showed a stable 2 mm right upper lobe nodule.  Lungs clear.  No new or suspicious pulmonary nodules.    -Patient is unable to tolerate any inhaler that has beta agonist due to feeling jittery and with headaches; she also says that tiotropium does not help with her symptoms neither  -Continue taking Daliresp 500 mcg daily  -Discussed possible biologic therapy for better asthma control.  Infectious workup, CBC with differential, IgE, and allergy panel results normal.  Ige 13.  -Will reach out to [facility] to see if she qualifies for a biologic  -Will start Singulair to see if it helps    Smoking status: Reviewed  Vaccination status: Up-to-date  Medications personally reviewed    Follow Up   No follow-ups on file.  Patient was given instructions and counseling regarding her condition or for health maintenance advice. Please see specific information pulled into the AVS if appropriate.

## 2025-04-16 ENCOUNTER — SPECIALTY PHARMACY (OUTPATIENT)
Dept: OTHER | Facility: HOSPITAL | Age: 40
End: 2025-04-16
Payer: COMMERCIAL

## 2025-04-16 RX ORDER — TEZEPELUMAB-EKKO 210 MG/1.9ML
210 INJECTION, SOLUTION SUBCUTANEOUS
Qty: 1.91 ML | Refills: 11 | Status: SHIPPED | OUTPATIENT
Start: 2025-04-16

## 2025-04-16 NOTE — PROGRESS NOTES
Specialty Pharmacy Patient Management Program  Pulmonology Initial Assessment     Jillian Segura is a 40 y.o. female with Asthma/COPD overlap seen by a Pulmonology provider and enrolled in the Pulmonology Patient Management program offered by [facility].  An initial outreach was conducted, including assessment of therapy appropriateness and specialty medication education for Tezspire. The patient was introduced to services offered by [facility], including: regular assessments, refill coordination, curbside pick-up or mail order delivery options, prior authorization maintenance, and financial assistance programs as applicable. The patient was also provided with contact information for the pharmacy team.     Insurance Coverage & Financial Support  PA obtained, $0 copay    Relevant Past Medical History and Comorbidities  Relevant medical history and concomitant health conditions were discussed with the patient. The patient's chart has been reviewed for relevant past medical history and comorbid health conditions and updated as necessary.   Past Medical History:   Diagnosis Date    Arthritis     Asthma     COPD (chronic obstructive pulmonary disease)     Lung nodules 2024    Other emphysema 2024     Social History     Socioeconomic History    Marital status: Single   Tobacco Use    Smoking status: Former     Current packs/day: 0.00     Average packs/day: 1 pack/day for 23.0 years (23.0 ttl pk-yrs)     Types: Cigarettes     Start date:      Quit date:      Years since quittin.2    Smokeless tobacco: Never   Vaping Use    Vaping status: Every Day    Substances: Nicotine, Flavoring    Devices: Pre-filled or refillable cartridge, Refillable tank   Substance and Sexual Activity    Alcohol use: Never    Drug use: Never    Sexual activity: Yes     Partners: Male     Birth control/protection: Tubal ligation     Problem list reviewed by Michelle Taylor RPH  on 4/16/2025 at 11:13 AM    Allergies  Known allergies and reactions were discussed with the patient. The patient's chart has been reviewed for  allergy information and updated as necessary.   Allergies   Allergen Reactions    Sulfamethoxazole-Trimethoprim GI Intolerance    Zithromax [Azithromycin] GI Intolerance     Stomach cramps/contractions    Egg-Derived Products GI Intolerance    Sulfa Antibiotics GI Intolerance     Allergies reviewed by Michelle Taylor RPH on 4/16/2025 at 11:13 AM    Relevant Laboratory Values  Common labs          12/16/2024    14:37   Common Labs   WBC 6.91    Hemoglobin 12.5    Hematocrit 37.8    Platelets 327        Lab Assessment  The above labs have been reviewed. No dose adjustments are needed for the specialty medication(s) based on the labs.     Current Medication List  This medication list has been reviewed with the patient and evaluated for any interactions or necessary modifications/recommendations, and updated to include all prescription medications, OTC medications, and supplements the patient is currently taking.  This list reflects what is contained in the patient's profile, which has also been marked as reviewed to communicate to other providers it is the most up to date version of the patient's current medication therapy.     Current Outpatient Medications:     acyclovir (ZOVIRAX) 800 MG tablet, TAKE ONE TABLET BY MOUTH 5 TIMES A DAY FOR 7 DAYS (Patient not taking: Reported on 4/15/2025), Disp: , Rfl:     albuterol sulfate  (90 Base) MCG/ACT inhaler, INHALE 1 PUFF BY MOUTH EVERY 4 HOURS AS NEEDED FOR SHORTNESS OF BREATH, Disp: , Rfl:     Budeson-Glycopyrrol-Formoterol (Breztri Aerosphere) 160-9-4.8 MCG/ACT aerosol inhaler, Inhale 2 puffs Every 12 (Twelve) Hours. Barely uses due to giving her the shakes (Patient not taking: Reported on 4/15/2025), Disp: 1 each, Rfl: 5    busPIRone (BUSPAR) 5 MG tablet, , Disp: , Rfl:     Caplyta 42 MG capsule, Take 1 capsule by mouth  Daily., Disp: , Rfl:     clobetasol (TEMOVATE) 0.05 % ointment, apply A thin layer TO THE AFFECTED AREA topically TWICE DAILY (Patient not taking: Reported on 4/15/2025), Disp: , Rfl:     fluconazole (DIFLUCAN) 150 MG tablet, TAKE ONE TABLET BY MOUTH AS A ONE TIME DOSE. REPEAT ONCE AFTER completing antibiotics, Disp: , Rfl:     fluticasone (FLONASE) 50 MCG/ACT nasal spray, instill 1-2 SPRAYS IN ech nostril ONCE DAILY AS NEEDED, Disp: , Rfl:     levalbuterol (XOPENEX) 0.63 MG/3ML nebulizer solution, Take 1 ampule by nebulization Every 6 (Six) Hours As Needed for Wheezing or Shortness of Air., Disp: 120 mL, Rfl: 11    montelukast (SINGULAIR) 10 MG tablet, Take 1 tablet by mouth Every Night., Disp: 30 tablet, Rfl: 5    nitrofurantoin, macrocrystal-monohydrate, (MACROBID) 100 MG capsule, Take 1 capsule by mouth Every 12 (Twelve) Hours., Disp: , Rfl:     ondansetron ODT (ZOFRAN-ODT) 4 MG disintegrating tablet, place 1 tablet on the tongue and allow to dissolve THREE TIMES DAILY AS NEEDED FOR NAUSEA, Disp: , Rfl:     phenazopyridine (PYRIDIUM) 200 MG tablet, TAKE ONE TABLET BY MOUTH THREE TIMES DAILY AFTER MEALS FOR 2 DAYS, Disp: , Rfl:     prazosin (MINIPRESS) 1 MG capsule, Take 1 capsule by mouth Every Night. (Patient not taking: Reported on 4/15/2025), Disp: , Rfl:     roflumilast (Daliresp) 500 MCG tablet tablet, Take 1 tablet by mouth Daily., Disp: 30 tablet, Rfl: 5    SSD 1 % cream, APPLY A 1/16th INCH (1.5mm) thick layer TO entire BURN AREA TWICE DAILY (Patient not taking: Reported on 4/15/2025), Disp: , Rfl:     Tezepelumab-ekko (Tezspire) 210 MG/1.91ML solution auto-injector, Inject 1.91 mL under the skin into the appropriate area as directed Every 28 (Twenty-Eight) Days., Disp: 1.91 mL, Rfl: 11    Ubrelvy 100 MG tablet, Take 1 tablet by mouth at onset of migraine, may repeat in 2 hours if no relief, max of 2 per day, Disp: , Rfl:     Vicks Sinex 12 Hour Decongest 0.05 % nasal spray, administer 2 SPRAYS IN EACH  nostril TWICE DAILY IN THE MORNING AND IN THE EVENING, Disp: , Rfl:   No current facility-administered medications for this visit.    Medicines reviewed by Michelle Taylor RPH on 4/16/2025 at 11:13 AM    Drug Interactions  None identified     Initial Education Provided for Specialty Medication  The patient has been provided with the following education and any applicable administration techniques (i.e. self-injection) have been demonstrated for the therapies indicated. All questions and concerns have been addressed prior to the patient receiving the medication, and the patient has verbalized understanding of the education and any materials provided.  Additional patient education shall be provided and documented upon request by the patient, provider or payer.         Tezspire (tezepelumab-ekko)        Medication Expectations   Why am I taking this medication? TEZSPIRE is indicated for the add-on maintenance treatment of adult and pediatric patients aged 12 years and older with severe asthma.   What should I expect while on this medication? You should expect a significant improvement in asthma symptom control and quality of life.  Up to 71% of patients experienced an exacerbation reduction, and a reduction in exacerbations requiring ED visits, urgent care, or hospitalizations.  70% of the lung funciton was achieved at 2 weeks with improvement sustained through 52 weeks.    How does the medication work? Tezepelumab is a thymic stromal lymphopoietin (TSLP) blocker, human monoclonal antibody IgG2 lambda that binds to human TSLP with a dissociation constant of 15.8 pM and blocks its interaction with the heterodimeric TSLP receptor. TSLP is a cytokine mainly derived from epithelial cells and occupies an upstream position in the asthma inflammatory cascade. By blocking TSLP, tezepelumab reduces biomarkers and cytokines associated with inflammation including blood eosinophils, airway submucosal eosinophils, IgE, FeNO, IL-5,  and IL-13; however, the mechanism of tezepelumab action in asthma has not been definitively established.   How long will I be on this medication for? The amount of time you will be on this medication is decided by your physician in how you respond to therapy.   How do I take this medication? This is a subcutaneous injection that can either be administered in your physician's office or for at-home administration.  No loading dose is needed.  At this time the pre-filled syringe must be administered in the physician's office.  The pre-filled pen is the only dose that is available for home use.  This injection is once every 4 weeks.   -If you are giving yourself the injection, the recommended injection site is the front of your thigh or the lower part of your stomach (abdomen). Do not inject yourself in the arm.   What are some possible side effects? The most common side effects of Tezspire include sore throat, joint pain, injection site reactions, and back pain.   What happens if I miss a dose? If a dose is missed, administer the dose as soon as possible.            Medication Safety   What are things I should warn my doctor immediately about? Hypersensitivity reactions, acute asthma symptoms or deteriorating disease, have a parasitic infection, recently had any live vaccines, are pregnant or plan to become pregnant, or are breastfeeding.   What are things that I should be cautious of? Injection site reactions or any of the side effects mentioned above.   What are some medications that can interact with this one? No formal drug interactions studies have been performed with Teszpire at this time.            Medication Storage/Handling   How should I handle this medication? Keep this medication our of reach of pets/children in original container.  Store upright in the original container to protect from light. Do not freeze or shake. Do not inject into skin that is tender, damaged, bruised, or scarred.  Rotate injection  sites.   How does this medication need to be stored? Store refrigerated between 36°F to 46°F (2°C to 8°C). If necessary, TEZSPIRE may be kept at room temperature between 68°F to 77?F (20°C to 25°C) for a maximum of 30 days.    How should I dispose of this medication? -Put your used pre-filled pen and cap in a sharps disposal container right away after use. Put other used supplies in your household trash.    -Do not throw away the pre-filled pen in your household trash    -If you do not have a FDA-cleared sharps disposal container, you may use a household container that is:   made of a heavy-duty plastic,   can be closed with a tight-fitting puncture-resistant lid, without sharps being able to come out,   upright and stable during use,   leak-resistant, and properly labeled to warn of hazardous waste inside the container            Resources/Support   How can I remind myself to take this medication? You can download a reminder mireya on your phone or use a calandar  to help with your injection.   Is financial support available?  Yes, [facility] offers copay assistance through their Tezspire Together program.   Which vaccines are recommended for me? Talk to your doctor before you schedule any vaccines including Live vaccines.             Adherence and Self-Administration  Adherence related to the patient's specialty therapy was discussed with the patient. The Adherence segment of this outreach has been reviewed and updated.   Is there a concern with patient's ability to self administer the medication correctly and without issue?: No  Were any potential barriers to adherence identified during the initial assessment or patient education?: No  Are there any concerns regarding the patient's understanding of the importance of medication adherence?: No  Methods for Supporting Patient Adherence and/or Self-Administration: Patient advised on pen use, and given website to watch video if needed    Goals of Therapy  Goals related to  the patient's specialty therapy were discussed with the patient. The Patient Goals segment of this outreach has been reviewed and updated.   Goals Addressed Today        Specialty Pharmacy General Goal      Decrease average frequency of asthma exacerbations to less than 2 in 6 months, decrease frequent use of steroids, and minimize adverse effects (infection, hypersensitivity reactions, arthralgia, ocular effects)                 Reassessment Plan & Follow-Up  Medication Therapy Changes: Start Tezspire  Related Plans, Therapy Recommendations, or Therapy Problems to Be Addressed: ICS intolerance, patient would like to stop if Tezspire provides better control.  Pharmacist to perform regular reassessments no more than (6) months from the previous assessment.  Care Coordinator to set up future refill outreaches, coordinate prescription delivery, and escalate clinical questions to pharmacist.   Welcome information and patient satisfaction survey to be sent by specialty pharmacy team with patient's initial fill.    Attestation  Therapeutic appropriateness: Appropriate   I attest the patient was actively involved in and has agreed to the above plan of care. If the prescribed therapy is at any point deemed not appropriate based on the current or future assessments, a consultation will be initiated with the patient's specialty care provider to determine the best course of action. The revised plan of therapy will be documented along with any additional patient education provided. Discussed aforementioned material with patient via telemedicine.    Michelle Taylor, PharmD  Clinic Specialty Pharmacist, Pulmonology  4/16/2025  11:13 EDT

## 2025-05-14 ENCOUNTER — SPECIALTY PHARMACY (OUTPATIENT)
Dept: PULMONOLOGY | Facility: CLINIC | Age: 40
End: 2025-05-14
Payer: COMMERCIAL

## 2025-05-14 ENCOUNTER — TELEPHONE (OUTPATIENT)
Dept: OTHER | Facility: HOSPITAL | Age: 40
End: 2025-05-14
Payer: COMMERCIAL

## 2025-05-14 NOTE — TELEPHONE ENCOUNTER
July 11, 2017      Dario Mathias MD  [address]  [place] [address]           [facility] - Dermatology  [address]  [facility] [address]  Phone: [phone]  Fax: [phone]          Patient: Radha Mccormick   MR Number: [id number]   YOB: 1990   Date of Visit: 7/11/2017       Dear Dr. Dario Mathias:    Thank you for referring Radha Mccormick to me for evaluation. Attached you will find relevant portions of my assessment and plan of care.    If you have questions, please do not hesitate to call me. I look forward to following Radha Mccormick along with you.    Sincerely,    Pardeep Lu MD    Enclosure  CC:  No Recipients    If you would like to receive this communication electronically, please contact [email or web address] or [phone] to request more information on [employer] Link access.    For providers and/or their staff who would like to refer a patient to [facility], please contact us through our one-stop-shop provider referral line, [facility], at [phone].    If you feel you have received this communication in error or would no longer like to receive these types of communications, please e-mail [email or web address]          Patient advised that she struggled with her first dose of Tezspire. She will come in to meet with me Friday, May 16, for further education and to do her next dose supervised by a pharmacist.    Michelle Taylor, PharmD  Specialty Pharmacist  Pulmonology/Gastroenterology    [phone]

## 2025-05-14 NOTE — PROGRESS NOTES
Specialty Pharmacy Patient Management Program  Refill Outreach     Jillian was contacted today regarding refills of their medication(s). Tezspire. Patient indicated that she had an issue with her injection last month and requested additional training. [facility] Pharmacist will reach out to coordinate additional in person training.    Refill Questions      Flowsheet Row Most Recent Value   Changes to allergies? No   Changes to medications? No   New conditions or infections since last clinic visit No   Unplanned office visit, urgent care, ED, or hospital admission in the last 4 weeks  No   How does patient/caregiver feel medication is working? Good   Financial problems or insurance changes  No   Since the previous refill, were any specialty medication doses or scheduled injections missed or delayed?  No   Does this patient require a clinical escalation to a pharmacist? No            Delivery Questions      Flowsheet Row Most Recent Value   Delivery method [employer]   Delivery address verified with patient/caregiver? Yes   Delivery address Home   Number of medications in delivery 1   Medication(s) being filled and delivered Tezepelumab-ekko (Tezspire)   Doses left of specialty medications 1 week   Copay verified? Yes   Copay amount $0.00   Copay form of payment No copayment ($0)   Delivery Date Selection 05/15/25   Signature Required Yes                 Follow-up: 28  day(s)     Kinga Mcgovern, Pharmacy Technician  5/14/2025  07:20 EDT

## 2025-06-10 ENCOUNTER — SPECIALTY PHARMACY (OUTPATIENT)
Dept: PULMONOLOGY | Facility: CLINIC | Age: 40
End: 2025-06-10
Payer: COMMERCIAL

## 2025-07-01 DIAGNOSIS — J45.20 MILD INTERMITTENT ASTHMA WITHOUT COMPLICATION: ICD-10-CM

## 2025-07-01 DIAGNOSIS — J43.8 OTHER EMPHYSEMA: ICD-10-CM

## 2025-07-01 RX ORDER — ROFLUMILAST 500 UG/1
500 TABLET ORAL DAILY
Qty: 30 TABLET | Refills: 5 | Status: SHIPPED | OUTPATIENT
Start: 2025-07-01

## 2025-07-07 ENCOUNTER — SPECIALTY PHARMACY (OUTPATIENT)
Dept: PULMONOLOGY | Facility: CLINIC | Age: 40
End: 2025-07-07
Payer: COMMERCIAL

## 2025-07-07 NOTE — PROGRESS NOTES
Specialty Pharmacy Patient Management Program  Refill Outreach     Jillian was contacted today regarding refills of their medication(s). Tezspire    Refill Questions      Flowsheet Row Most Recent Value   Changes to allergies? No   Changes to medications? No   New conditions or infections since last clinic visit No   Unplanned office visit, urgent care, ED, or hospital admission in the last 4 weeks  No   How does patient/caregiver feel medication is working? Very good   Financial problems or insurance changes  No   Since the previous refill, were any specialty medication doses or scheduled injections missed or delayed?  No   Does this patient require a clinical escalation to a pharmacist? No            Delivery Questions      Flowsheet Row Most Recent Value   Delivery method UPS   Delivery address verified with patient/caregiver? Yes   Delivery address Home   Number of medications in delivery 1   Medication(s) being filled and delivered Tezepelumab-ekko (Tezspire)   Doses left of specialty medications 1 week   Copay verified? Yes   Copay amount $0.00   Copay form of payment No copayment ($0)   Delivery Date Selection 07/08/25   Signature Required Yes                 Follow-up: 28  day(s)     Kinga Mcgovern, Pharmacy Technician  7/7/2025  11:17 EDT

## 2025-07-22 ENCOUNTER — OFFICE VISIT (OUTPATIENT)
Dept: CARDIOLOGY | Facility: CLINIC | Age: 40
End: 2025-07-22
Payer: COMMERCIAL

## 2025-07-22 VITALS
WEIGHT: 113.5 LBS | HEIGHT: 62 IN | OXYGEN SATURATION: 100 % | HEART RATE: 78 BPM | BODY MASS INDEX: 20.89 KG/M2 | DIASTOLIC BLOOD PRESSURE: 59 MMHG | SYSTOLIC BLOOD PRESSURE: 105 MMHG

## 2025-07-22 DIAGNOSIS — I44.0 FIRST DEGREE AV BLOCK: ICD-10-CM

## 2025-07-22 DIAGNOSIS — Z82.49 FAMILY HISTORY OF HEART DISEASE: ICD-10-CM

## 2025-07-22 DIAGNOSIS — R00.2 PALPITATIONS: Primary | ICD-10-CM

## 2025-07-22 RX ORDER — METHOCARBAMOL 500 MG/1
500 TABLET, FILM COATED ORAL EVERY 8 HOURS PRN
COMMUNITY

## 2025-07-22 RX ORDER — ONDANSETRON 8 MG/1
TABLET, FILM COATED ORAL
COMMUNITY

## 2025-07-22 RX ORDER — ACETAMINOPHEN 325 MG/1
TABLET ORAL
COMMUNITY
End: 2025-07-22

## 2025-07-22 RX ORDER — IBUPROFEN 200 MG
TABLET ORAL
COMMUNITY

## 2025-07-22 RX ORDER — BACLOFEN 10 MG/1
TABLET ORAL
COMMUNITY

## 2025-07-22 RX ORDER — PROMETHAZINE HYDROCHLORIDE 25 MG/1
TABLET ORAL
COMMUNITY

## 2025-07-22 RX ORDER — SUMATRIPTAN SUCCINATE 100 MG/1
TABLET ORAL
COMMUNITY

## 2025-07-22 RX ORDER — DICLOFENAC SODIUM 75 MG/1
75 TABLET, DELAYED RELEASE ORAL 2 TIMES DAILY PRN
COMMUNITY

## 2025-07-22 RX ORDER — MELOXICAM 15 MG/1
TABLET ORAL
COMMUNITY

## 2025-07-22 NOTE — PROGRESS NOTES
[facility]  INTERVENTIONAL CARDIOLOGY NEW PATIENT OFFICE VISIT      Chief Complaint  Abnormal ECG and Establish Care    Subjective          History of Present Illness    Jillian Segura is a 40 y.o. female who presents to [facility] for new patient visit.       History of Present Illness  The patient presents for evaluation of palpitations, migraines, sleep disturbances, abnormal EKG showing first-degree AV block.    She experiences chest palpitations, described as a sensation of her heart leaping out of her chest. The frequency of these episodes varies, with some days being symptom-free and others marked by 2 to 3 episodes. These episodes are not associated with her migraines and are accompanied by shaking, similar to the effect of using a rescue inhaler. She has been experiencing palpitations for at least a year, with 3 episodes reported in the past week, each lasting about a minute. These episodes can occur even when she is at rest and resolve spontaneously. They leave her feeling fatigued and dizzy and occasionally affect her speech. She also reports occasional chest pain but finds it difficult to distinguish from her lung discomfort. She does not consume energy drinks or coffee but drinks 2 to 3 bottles of Mountain Dew daily. She has been vaping since quitting smoking 3 years ago. She has tried nicotine patches and pills but discontinued due to side effects. She is concerned about potential blocked arteries due to her family history of peripheral artery disease.    She recently underwent an EKG at her primary care office due to a severe migraine that lasted approximately 8 to 9 days. Her current migraine medications include Ubrelvy, Imitrex, and Qulipta, which provide some relief but are not consistently effective.    She has a history of anxiety and poor sleep quality, waking up approximately once an hour and averaging only 1 to 2 hours of solid sleep per night. She does not  believe she has sleep apnea and does not snore or gasp during sleep. She has previously taken BuSpar for sleep issues.    She experiences significant leg swelling, which is exacerbated by standing for long periods as part of her job as a . The swelling is so severe that it causes indentations from her socks.    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 She has severe asthma and COPD. She had a pulmonary function test last year to get on the Trelegy injection once a month because she has a reaction to albuterol that gives her shakes and jitters. She does not have to use the albuterol as much anymore. She had her thyroid checked a long time ago.    SOCIAL HISTORY  Occupations:   Tobacco: The patient quit smoking 3 years ago and currently vapes.  Coffee/Tea/Caffeine-containing Drinks: The patient drinks Mountain Dew, approximately two or three 16.9-ounce bottles per day.    FAMILY HISTORY  - Mother: Peripheral artery disease        Past History:  Past Medical History:   Diagnosis Date    Arthritis     Asthma     COPD (chronic obstructive pulmonary disease)     Lung nodules 07/08/2024    Other emphysema 07/08/2024       Medical History:  Past Medical History:   Diagnosis Date    Arthritis     Asthma     COPD (chronic obstructive pulmonary disease)     Lung nodules 07/08/2024    Other emphysema 07/08/2024       Surgical History:   has a past surgical history that includes Tonsillectomy; Tubal ligation; Ectopic pregnancy surgery; Cystoscopy (N/A, 07/18/2024); cystoscopy botox injection of bladder (N/A, 07/18/2024); and Bronchoscopy (03/26/2025).     Family History:   family history includes COPD in her mother; Cancer in her mother; Clotting disorder in her mother; Diabetes in her mother; Hypertension in her mother.     Social History:   reports that she quit smoking about 4 years ago. Her smoking use included  cigarettes. She started smoking about 27 years ago. She has a 23 pack-year smoking history. She has been exposed to tobacco smoke. She has never used smokeless tobacco. She reports that she does not drink alcohol and does not use drugs.    Allergies:   Sulfamethoxazole-trimethoprim, Zithromax [azithromycin], Egg-derived products, and Sulfa antibiotics    Current Outpatient Medications on File Prior to Visit   Medication Sig    albuterol sulfate  (90 Base) MCG/ACT inhaler INHALE 1 PUFF BY MOUTH EVERY 4 HOURS AS NEEDED FOR SHORTNESS OF BREATH    baclofen (LIORESAL) 10 MG tablet TAKE ONE TABLET BY MOUTH THREE TIMES DAILY AS NEEDED FOR 30 DAYS    Caplyta 42 MG capsule Take 1 capsule by mouth Daily.    clobetasol (TEMOVATE) 0.05 % ointment     diclofenac (VOLTAREN) 75 MG EC tablet Take 1 tablet by mouth 2 (Two) Times a Day As Needed.    fluconazole (DIFLUCAN) 150 MG tablet TAKE ONE TABLET BY MOUTH AS A ONE TIME DOSE. REPEAT ONCE AFTER completing antibiotics    ibuprofen (ADVIL,MOTRIN) 200 MG tablet Take 2 tablets by oral route as needed.    levalbuterol (XOPENEX) 0.63 MG/3ML nebulizer solution Take 1 ampule by nebulization Every 6 (Six) Hours As Needed for Wheezing or Shortness of Air.    meloxicam (MOBIC) 15 MG tablet TAKE ONE TABLET BY MOUTH EVERY DAY BEFORE A MEAL    methocarbamol (ROBAXIN) 500 MG tablet Take 1 tablet by mouth Every 8 (Eight) Hours As Needed.    montelukast (SINGULAIR) 10 MG tablet Take 1 tablet by mouth Every Night.    ondansetron (ZOFRAN) 8 MG tablet TAKE ONE TABLET BY MOUTH THREE TIMES DAILY FOR 2 DAYS AS NEEDED    ondansetron ODT (ZOFRAN-ODT) 4 MG disintegrating tablet place 1 tablet on the tongue and allow to dissolve THREE TIMES DAILY AS NEEDED FOR NAUSEA    promethazine (PHENERGAN) 25 MG tablet TAKE ONE TABLET BY MOUTH EVERY 4 TO 6 HOURS AS NEEDED    roflumilast (Daliresp) 500 MCG tablet tablet Take 1 tablet by mouth Daily.    SSD 1 % cream     SUMAtriptan (IMITREX) 100 MG tablet TAKE  ONE TABLET BY MOUTH AFTER THE ONSET OF migrane; MAY REPEAT AFTER 2 hours IF HEADACHE returns, not TO exceed 200mg IN 24hrs (insurance limit OF 9 PER 30 DAYS)    Tezepelumab-ekko (Tezspire) 210 MG/1.91ML solution auto-injector Inject 1.91 mL under the skin into the appropriate area as directed Every 28 (Twenty-Eight) Days.    Ubrelvy 100 MG tablet Take 1 tablet by mouth at onset of migraine, may repeat in 2 hours if no relief, max of 2 per day    Budeson-Glycopyrrol-Formoterol (Breztri Aerosphere) 160-9-4.8 MCG/ACT aerosol inhaler Inhale 2 puffs Every 12 (Twelve) Hours. Barely uses due to giving her the shakes (Patient not taking: Reported on 7/22/2025)    [DISCONTINUED] acetaminophen (Tylenol) 325 MG tablet Take 2 tablets by oral route as needed. (Patient not taking: Reported on 7/22/2025)    [DISCONTINUED] acyclovir (ZOVIRAX) 800 MG tablet  (Patient not taking: Reported on 7/22/2025)    [DISCONTINUED] amoxicillin-clavulanate (AUGMENTIN) 875-125 MG per tablet Take 1 tablet by mouth Every 12 (Twelve) Hours. (Patient not taking: Reported on 7/22/2025)    [DISCONTINUED] busPIRone (BUSPAR) 5 MG tablet  (Patient not taking: Reported on 7/22/2025)    [DISCONTINUED] fluticasone (FLONASE) 50 MCG/ACT nasal spray instill 1-2 SPRAYS IN ech nostril ONCE DAILY AS NEEDED    [DISCONTINUED] nitrofurantoin, macrocrystal-monohydrate, (MACROBID) 100 MG capsule Take 1 capsule by mouth Every 12 (Twelve) Hours.    [DISCONTINUED] phenazopyridine (PYRIDIUM) 200 MG tablet TAKE ONE TABLET BY MOUTH THREE TIMES DAILY AFTER MEALS FOR 2 DAYS (Patient not taking: Reported on 7/22/2025)    [DISCONTINUED] prazosin (MINIPRESS) 1 MG capsule Take 1 capsule by mouth Every Night. (Patient not taking: Reported on 4/15/2025)    [DISCONTINUED] Vicks Sinex 12 Hour Decongest 0.05 % nasal spray administer 2 SPRAYS IN EACH nostril TWICE DAILY IN THE MORNING AND IN THE EVENING     No current facility-administered medications on file prior to visit.     "      Review of Systems   Negative ROS except as mentioned in HPI above.     Objective     /59 (BP Location: Left arm, Patient Position: Sitting, Cuff Size: Adult)   Pulse 78   Ht 157.5 cm (62\")   Wt 51.5 kg (113 lb 8 oz)   SpO2 100%   BMI 20.76 kg/m²       Physical Exam  General : Alert, awake, no acute distress  Neck : Supple, no carotid bruit, no jugular venous distention  CVS : Regular rate and rhythm, no murmur, rubs or gallops  Lungs: Clear to auscultation bilaterally, no crackles or rhonchi  Abdomen: Soft, nontender, bowel sounds heard in all 4 quadrants  Extremities: Warm, well-perfused, no pedal edema      Result Review :     The following data was reviewed by: Anand Levin MD on 07/22/2025:      CBC          12/16/2024    14:37   CBC   WBC 6.91    RBC 4.05    Hemoglobin 12.5    Hematocrit 37.8    MCV 93.3    MCH 30.9    MCHC 33.1    RDW 12.5    Platelets 327               Data reviewed: Cardiology studies    No results found for this or any previous visit.      No results found for this or any previous visit.          Procedures  Recent EKG from 7/7/2025 shows sinus rhythm with first-degree AV block.    The ASCVD Risk score (Laneview DK, et al., 2019) failed to calculate for the following reasons:    Cannot find a previous HDL lab    Cannot find a previous total cholesterol lab         Assessment and Plan      Diagnoses and all orders for this visit:    1. Palpitations (Primary)  -     Cardiac Event Monitor (JAZIEL) or Mobile Cardiac Outpatient Telemetry (MCT)  -     Thyroid Panel With TSH; Future  -     Lipid Panel; Future  -     Treadmill Stress Test; Future    2. First degree AV block  -     Cardiac Event Monitor (JAZIEL) or Mobile Cardiac Outpatient Telemetry (MCT)  -     Thyroid Panel With TSH; Future  -     Lipid Panel; Future  -     Treadmill Stress Test; Future    3. Family history of heart disease  -     Cardiac Event Monitor (JAZIEL) or Mobile Cardiac Outpatient Telemetry (MCT)  -     Thyroid " Panel With TSH; Future  -     Lipid Panel; Future  -     Treadmill Stress Test; Future        Assessment & Plan  1. Palpitations:  - A cardiac monitor will be provided for a duration of 2 weeks to assess heart rhythm.  - Blood work will be ordered to check cholesterol and thyroid levels.  - A treadmill stress test will be conducted to evaluate the heart's response to physical exertion.  - Advised to reduce intake of Mountain Dew and consider discontinuing vaping to see if these changes alleviate palpitations.  - Discussed that caffeine in Mountain Dew and nicotine in vaping products can contribute to increased heart rate and jitteriness.    2. Migraines:  - Continue current medication regimen of Ubrelvy, Imitrex, and Qulipta.  - Monitor symptoms and effectiveness of medications.  - If migraines persist or worsen, further evaluation and potential adjustment of the treatment plan will be considered.    3. Sleep disturbances:  - Advised to consult primary care physician regarding the possibility of starting trazodone for sleep improvement.  - Discussed the importance of good sleep for overall health and daily functioning.    4. Leg swelling:  - Advised to monitor leg swelling and take photographs for documentation purposes.  - If swelling worsens, further evaluation will be necessary.    Follow-up: Scheduled for 6 months from now.      Patient was educated on cardiac diet, adequate exercise and achieving/maintaining optimal weight.    Jillian Segura  reports that she quit smoking about 4 years ago. Her smoking use included cigarettes. She started smoking about 27 years ago. She has a 23 pack-year smoking history. She has been exposed to tobacco smoke. She has never used smokeless tobacco.         Follow Up     Return in about 6 months (around 1/22/2026) for Next scheduled follow up, With Cheryl Granados.        I spent 45 minutes caring for this patient on this date of service. This time includes time spent by me  in the following activities:preparing for the visit, reviewing tests, obtaining and/or reviewing a separately obtained history, performing a medically appropriate examination and/or evaluation , counseling and educating the patient/family/caregiver, ordering medications, tests, or procedures, referring and communicating with other health care professionals , documenting information in the medical record, independently interpreting results and communicating that information with the patient/family/caregiver, and care coordination.     The patient was seen and examined. Work by the provider also included review and/or ordering of lab tests, review and/or ordering of radiology tests, review and/or ordering of medicine tests, discussion with other physicians or providers, independent review of data, obtaining old records, review/summation of old records, and/or other review.    I have reviewed the family history, social history, and past medical history for this patient. Previous information and data has been reviewed and updated as needed. I have reviewed and verified the chief complaint, history, and other documentation. The patient was interviewed and examined in the clinic and the chart reviewed. The previous observations, recommendations, and conclusions were reviewed including those of other providers.     The plan was discussed with the patient and/or family. The patient was given time to ask questions and these questions were answered. At the conclusion of their visit they had no additional questions or concerns and all questions were answered to their satisfaction.     Patient was given instructions and counseling regarding her condition or for health maintenance advice. Please see specific information pulled into the AVS if appropriate.      Patient or patient representative verbalized consent for the use of Ambient Listening during the visit with  Anand Levin MD for chart documentation. 7/22/2025  16:53  EDT      Anand Levin MD, FAC  07/22/25  16:01 EDT    Dictated Utilizing Dragon Dictation

## 2025-07-30 ENCOUNTER — SPECIALTY PHARMACY (OUTPATIENT)
Dept: PULMONOLOGY | Facility: CLINIC | Age: 40
End: 2025-07-30
Payer: COMMERCIAL

## 2025-07-30 NOTE — PROGRESS NOTES
Specialty Pharmacy Patient Management Program  MyChart Refill Outreach       Jillian was contacted today regarding refills of their medication(s). Tezspire    MyChart Questionnaire Responses      Flowsheet Row Questionnaire Series Submission from 7/30/2025 in Initial Department with Anyi, Generic Provider   Changes to allergies? No    Changes to medications? No    New conditions or infections since last clinic visit No    Unplanned office visit, urgent care, ED, or hospital admission in the last 4 weeks  No    How does patient/caregiver feel medication is working? Very good    Financial problems or insurance changes  No    Since the previous refill, were any specialty medication doses or scheduled injections missed or delayed?  No    Delivery address Home    Doses left of specialty medications 0    Copay verified? Yes    Delivery Date Selection 08/01/25                 Delivery Questions      Flowsheet Row Most Recent Value   Delivery method UPS   Delivery address verified with patient/caregiver? Yes   Delivery address Home   Number of medications in delivery 1   Medication(s) being filled and delivered Tezepelumab-ekko (Tezspire)   Doses left of specialty medications 0   Copay verified? Yes   Copay amount $0.00   Copay form of payment No copayment ($0)   Delivery Date Selection 08/01/25   Signature Required Yes                   Follow-up: 28  day(s)     Kinga Mcgovern, Pharmacy Technician  7/30/2025  10:08 EDT

## 2025-08-07 ENCOUNTER — HOSPITAL ENCOUNTER (OUTPATIENT)
Facility: HOSPITAL | Age: 40
Discharge: HOME | End: 2025-08-07
Admitting: INTERNAL MEDICINE
Payer: COMMERCIAL

## 2025-08-07 VITALS — WEIGHT: 112 LBS | BODY MASS INDEX: 20.61 KG/M2 | HEIGHT: 62 IN

## 2025-08-07 DIAGNOSIS — I44.0 FIRST DEGREE AV BLOCK: ICD-10-CM

## 2025-08-07 DIAGNOSIS — Z82.49 FAMILY HISTORY OF HEART DISEASE: ICD-10-CM

## 2025-08-07 DIAGNOSIS — R00.2 PALPITATIONS: ICD-10-CM

## 2025-08-07 LAB
BH CV IMMEDIATE POST RECOVERY TECH DATA SYMPTOMS: NORMAL
BH CV IMMEDIATE POST TECH DATA BLOOD PRESSURE: NORMAL MMHG
BH CV IMMEDIATE POST TECH DATA HEART RATE: 136 BPM
BH CV IMMEDIATE POST TECH DATA OXYGEN SATS: 98 %
BH CV NINE MINUTE RECOVERY TECH DATA BLOOD PRESSURE: NORMAL MMHG
BH CV NINE MINUTE RECOVERY TECH DATA HEART RATE: 85 BPM
BH CV NINE MINUTE RECOVERY TECH DATA OXYGEN SATURATION: 97 %
BH CV NINE MINUTE RECOVERY TECH DATA SYMPTOMS: NORMAL
BH CV SIX MINUTE RECOVERY TECH DATA BLOOD PRESSURE: NORMAL
BH CV SIX MINUTE RECOVERY TECH DATA HEART RATE: 91 BPM
BH CV SIX MINUTE RECOVERY TECH DATA OXYGEN SATURATION: 98 %
BH CV SIX MINUTE RECOVERY TECH DATA SYMPTOMS: NORMAL
BH CV STRESS BP STAGE 1: NORMAL
BH CV STRESS BP STAGE 2: NORMAL
BH CV STRESS DURATION MIN STAGE 1: 3
BH CV STRESS DURATION MIN STAGE 2: 3
BH CV STRESS DURATION MIN STAGE 3: 2
BH CV STRESS DURATION SEC STAGE 1: 0
BH CV STRESS DURATION SEC STAGE 2: 0
BH CV STRESS DURATION SEC STAGE 3: 4
BH CV STRESS GRADE STAGE 1: 10
BH CV STRESS GRADE STAGE 2: 12
BH CV STRESS GRADE STAGE 3: 14
BH CV STRESS HR STAGE 1: 105
BH CV STRESS HR STAGE 2: 129
BH CV STRESS HR STAGE 3: 167
BH CV STRESS METS STAGE 1: 5
BH CV STRESS METS STAGE 2: 7.5
BH CV STRESS METS STAGE 3: 10
BH CV STRESS O2 STAGE 1: 99
BH CV STRESS O2 STAGE 2: 99
BH CV STRESS O2 STAGE 3: 98
BH CV STRESS PROTOCOL 1: NORMAL
BH CV STRESS RECOVERY BP: NORMAL MMHG
BH CV STRESS RECOVERY HR: 85 BPM
BH CV STRESS RECOVERY O2: 97 %
BH CV STRESS SPEED STAGE 1: 1.7
BH CV STRESS SPEED STAGE 2: 2.5
BH CV STRESS SPEED STAGE 3: 3.4
BH CV STRESS STAGE 1: 1
BH CV STRESS STAGE 2: 2
BH CV STRESS STAGE 3: 3
BH CV THREE MINUTE POST TECH DATA BLOOD PRESSURE: NORMAL MMHG
BH CV THREE MINUTE POST TECH DATA HEART RATE: 101 BPM
BH CV THREE MINUTE POST TECH DATA OXYGEN SATURATION: 98 %
BH CV THREE MINUTE RECOVERY TECH DATA SYMPTOM: NORMAL
MAXIMAL PREDICTED HEART RATE: 179 BPM
PERCENT MAX PREDICTED HR: 93.3 %
STRESS BASELINE BP: NORMAL MMHG
STRESS BASELINE HR: 65 BPM
STRESS O2 SAT REST: 98 %
STRESS PERCENT HR: 110 %
STRESS POST ESTIMATED WORKLOAD: 10.2 METS
STRESS POST EXERCISE DUR MIN: 8 MIN
STRESS POST EXERCISE DUR SEC: 4 SEC
STRESS POST O2 SAT PEAK: 98 %
STRESS POST PEAK BP: NORMAL MMHG
STRESS POST PEAK HR: 167 BPM
STRESS TARGET HR: 152 BPM

## 2025-08-07 PROCEDURE — 93017 CV STRESS TEST TRACING ONLY: CPT

## 2025-08-08 ENCOUNTER — OFFICE VISIT (OUTPATIENT)
Dept: PULMONOLOGY | Facility: CLINIC | Age: 40
End: 2025-08-08
Payer: COMMERCIAL

## 2025-08-08 ENCOUNTER — RESULTS FOLLOW-UP (OUTPATIENT)
Dept: CARDIOLOGY | Facility: CLINIC | Age: 40
End: 2025-08-08
Payer: COMMERCIAL

## 2025-08-08 VITALS
SYSTOLIC BLOOD PRESSURE: 103 MMHG | WEIGHT: 114.2 LBS | TEMPERATURE: 98 F | OXYGEN SATURATION: 99 % | HEIGHT: 62 IN | RESPIRATION RATE: 14 BRPM | DIASTOLIC BLOOD PRESSURE: 71 MMHG | HEART RATE: 80 BPM | BODY MASS INDEX: 21.02 KG/M2

## 2025-08-08 DIAGNOSIS — J44.89 ASTHMA-COPD OVERLAP SYNDROME: ICD-10-CM

## 2025-08-08 DIAGNOSIS — J45.20 MILD INTERMITTENT ASTHMA WITHOUT COMPLICATION: Primary | ICD-10-CM

## 2025-08-08 DIAGNOSIS — R91.8 LUNG NODULES: ICD-10-CM

## 2025-08-08 DIAGNOSIS — Z72.0 VAPES NICOTINE CONTAINING SUBSTANCE: ICD-10-CM

## 2025-08-08 DIAGNOSIS — Z87.891 FORMER TOBACCO USE: ICD-10-CM

## 2025-08-08 DIAGNOSIS — Z80.1 FAMILY HISTORY OF LUNG CANCER: ICD-10-CM

## 2025-08-08 DIAGNOSIS — R93.89 ABNORMAL CT OF THE CHEST: ICD-10-CM

## 2025-08-08 DIAGNOSIS — R06.00 DYSPNEA, UNSPECIFIED TYPE: ICD-10-CM

## 2025-08-08 RX ORDER — CALCIUM CARBONATE/VITAMIN D3 600MG-5MCG
200 TABLET ORAL
COMMUNITY
Start: 2025-07-25

## 2025-08-13 LAB
CV ZIO BASELINE AVG BPM: 75
CV ZIO BASELINE BPM HIGH: 163
CV ZIO BASELINE BPM LOW: 44

## 2025-08-19 LAB
CV ZIO BASELINE AVG BPM: 75
CV ZIO BASELINE BPM HIGH: 163
CV ZIO BASELINE BPM LOW: 44

## (undated) DEVICE — GLOVE,SURG,SENSICARE SLT,LF,PF,6.5: Brand: MEDLINE

## (undated) DEVICE — GOWN,REINFORCE,POLY,SIRUS,BREATH SLV,XLG: Brand: MEDLINE

## (undated) DEVICE — SOL IRRG H2O BG 3000ML STRL

## (undated) DEVICE — CYSTO PACK: Brand: MEDLINE INDUSTRIES, INC.

## (undated) DEVICE — Device: Brand: SINGLE USE INJECTOR NM-221C-0427

## (undated) DEVICE — TOWEL,OR,DSP,ST,BLUE,STD,4/PK,20PK/CS: Brand: MEDLINE

## (undated) DEVICE — SKIN PREP TRAY W/CHG: Brand: MEDLINE INDUSTRIES, INC.

## (undated) DEVICE — Device

## (undated) DEVICE — SOL IRR NACL 0.9PCT 3000ML

## (undated) DEVICE — MEDICINE CUP, GRADUATED, STER: Brand: MEDLINE

## (undated) DEVICE — CYSTO/BLADDER IRRIGATION SET, REGULATING CLAMP

## (undated) DEVICE — BASIC SINGLE BASIN-LF: Brand: MEDLINE INDUSTRIES, INC.

## (undated) DEVICE — STERILE POLYISOPRENE POWDER-FREE SURGICAL GLOVES: Brand: PROTEXIS